# Patient Record
Sex: FEMALE | Race: WHITE | NOT HISPANIC OR LATINO | Employment: PART TIME | URBAN - METROPOLITAN AREA
[De-identification: names, ages, dates, MRNs, and addresses within clinical notes are randomized per-mention and may not be internally consistent; named-entity substitution may affect disease eponyms.]

---

## 2016-08-29 LAB
EXTERNAL ABO GROUPING: NORMAL
EXTERNAL ANTIBODY SCREEN: NEGATIVE
EXTERNAL HEPATITIS B SURFACE ANTIGEN: NEGATIVE
EXTERNAL RH FACTOR: POSITIVE
EXTERNAL RUBELLA QUALITATIVE: NORMAL
EXTERNAL SYPHILIS RPR SCREEN: NORMAL
HIV1 AB SPEC QL IA.RAPID: NEGATIVE

## 2017-02-09 PROBLEM — O36.8190 DECREASED FETAL MOVEMENT: Status: ACTIVE | Noted: 2017-02-09

## 2017-03-20 ENCOUNTER — LAB REQUISITION (OUTPATIENT)
Dept: LAB | Facility: HOSPITAL | Age: 27
End: 2017-03-20

## 2017-03-20 DIAGNOSIS — Z34.83 ENCOUNTER FOR SUPERVISION OF OTHER NORMAL PREGNANCY, THIRD TRIMESTER: ICD-10-CM

## 2017-03-20 PROCEDURE — 87081 CULTURE SCREEN ONLY: CPT | Performed by: OBSTETRICS & GYNECOLOGY

## 2017-03-23 LAB
BACTERIA SPEC AEROBE CULT: NORMAL
EXTERNAL GROUP B STREP ANTIGEN: NEGATIVE

## 2017-03-26 ENCOUNTER — HOSPITAL ENCOUNTER (INPATIENT)
Facility: HOSPITAL | Age: 27
LOS: 4 days | Discharge: HOME | End: 2017-03-31
Attending: OBSTETRICS & GYNECOLOGY | Admitting: OBSTETRICS & GYNECOLOGY

## 2017-03-26 PROCEDURE — 87086 URINE CULTURE/COLONY COUNT: CPT | Performed by: OBSTETRICS & GYNECOLOGY

## 2017-03-26 PROCEDURE — 81001 URINALYSIS AUTO W/SCOPE: CPT | Performed by: OBSTETRICS & GYNECOLOGY

## 2017-03-26 PROCEDURE — 80306 DRUG TEST PRSMV INSTRMNT: CPT | Performed by: OBSTETRICS & GYNECOLOGY

## 2017-03-26 PROCEDURE — 59025 FETAL NON-STRESS TEST: CPT

## 2017-03-27 ENCOUNTER — ANESTHESIA EVENT (OUTPATIENT)
Dept: LABOR AND DELIVERY | Facility: HOSPITAL | Age: 27
End: 2017-03-27

## 2017-03-27 ENCOUNTER — ANESTHESIA (OUTPATIENT)
Dept: LABOR AND DELIVERY | Facility: HOSPITAL | Age: 27
End: 2017-03-27

## 2017-03-27 PROBLEM — O45.90 PLACENTAL ABRUPTION: Status: ACTIVE | Noted: 2017-03-27

## 2017-03-27 LAB
ABO GROUP BLD: NORMAL
AMPHET+METHAMPHET UR QL: NEGATIVE
AMPHETAMINES UR QL: NEGATIVE
ATMOSPHERIC PRESS: ABNORMAL MMHG
ATMOSPHERIC PRESS: ABNORMAL MMHG
BACTERIA UR QL AUTO: ABNORMAL /HPF
BARBITURATES UR QL SCN: NEGATIVE
BASE EXCESS BLDCOA CALC-SCNC: 0.1 MMOL/L (ref 0–2)
BASE EXCESS BLDCOV CALC-SCNC: -0.9 MMOL/L (ref 0–2)
BDY SITE: ABNORMAL
BENZODIAZ UR QL SCN: NEGATIVE
BILIRUB UR QL STRIP: NEGATIVE
BLD GP AB SCN SERPL QL: NEGATIVE
BUPRENORPHINE SERPL-MCNC: NEGATIVE NG/ML
CANNABINOIDS SERPL QL: NEGATIVE
CLARITY UR: CLEAR
CO2 BLDA-SCNC: 26.3 MMOL/L (ref 22–33)
CO2 BLDA-SCNC: 28.4 MMOL/L (ref 22–33)
COCAINE UR QL: NEGATIVE
COLOR UR: YELLOW
DEPRECATED RDW RBC AUTO: 50.7 FL (ref 37–54)
ERYTHROCYTE [DISTWIDTH] IN BLOOD BY AUTOMATED COUNT: 14.7 % (ref 11.3–14.5)
GLUCOSE UR STRIP-MCNC: NEGATIVE MG/DL
HCO3 BLDCOA-SCNC: 26.8 MMOL/L (ref 16.9–20.5)
HCO3 BLDCOV-SCNC: 24.9 MMOL/L (ref 18.6–21.4)
HCT VFR BLD AUTO: 34.1 % (ref 34.5–44)
HGB BLD-MCNC: 11.2 G/DL (ref 11.5–15.5)
HGB BLDA-MCNC: 14.3 G/DL (ref 14–18)
HGB BLDA-MCNC: 14.5 G/DL (ref 14–18)
HGB UR QL STRIP.AUTO: NEGATIVE
HOROWITZ INDEX BLD+IHG-RTO: 21 %
HOROWITZ INDEX BLD+IHG-RTO: 21 %
HYALINE CASTS UR QL AUTO: ABNORMAL /LPF
KETONES UR QL STRIP: ABNORMAL
LEUKOCYTE ESTERASE UR QL STRIP.AUTO: ABNORMAL
MCH RBC QN AUTO: 31.3 PG (ref 27–31)
MCHC RBC AUTO-ENTMCNC: 32.8 G/DL (ref 32–36)
MCV RBC AUTO: 95.3 FL (ref 80–99)
METHADONE UR QL SCN: NEGATIVE
MODALITY: ABNORMAL
MODALITY: ABNORMAL
NITRITE UR QL STRIP: NEGATIVE
OPIATES UR QL: NEGATIVE
OXYCODONE UR QL SCN: NEGATIVE
PCO2 BLDCOA: 53.5 MMHG (ref 43.3–54.9)
PCO2 BLDCOV: 45 MM HG (ref 30–60)
PCP UR QL SCN: NEGATIVE
PH BLDCOA: 7.31 [PH] (ref 7.2–7.3)
PH BLDCOV: 7.35 [PH] (ref 7.19–7.46)
PH UR STRIP.AUTO: 7 [PH] (ref 5–8)
PLATELET # BLD AUTO: 163 10*3/MM3 (ref 150–450)
PMV BLD AUTO: 11.6 FL (ref 6–12)
PO2 BLDCOA: 19.1 MMHG (ref 11.5–43.3)
PO2 BLDCOV: 29 MM HG
PROPOXYPH UR QL: NEGATIVE
PROT UR QL STRIP: NEGATIVE
RBC # BLD AUTO: 3.58 10*6/MM3 (ref 3.89–5.14)
RBC # UR: ABNORMAL /HPF
REF LAB TEST METHOD: ABNORMAL
RH BLD: POSITIVE
SAO2 % BLDCOA: 35.5 %
SAO2 % BLDCOA: ABNORMAL % (ref 45–75)
SAO2 % BLDCOV: 64 %
SP GR UR STRIP: 1.01 (ref 1–1.03)
SQUAMOUS #/AREA URNS HPF: ABNORMAL /HPF
TRICYCLICS UR QL SCN: NEGATIVE
UROBILINOGEN UR QL STRIP: ABNORMAL
WBC NRBC COR # BLD: 9.97 10*3/MM3 (ref 3.5–10.8)
WBC UR QL AUTO: ABNORMAL /HPF

## 2017-03-27 PROCEDURE — 59025 FETAL NON-STRESS TEST: CPT

## 2017-03-27 PROCEDURE — 25010000002 MIDAZOLAM PER 1 MG: Performed by: NURSE ANESTHETIST, CERTIFIED REGISTERED

## 2017-03-27 PROCEDURE — 25010000002 FENTANYL CITRATE (PF) 100 MCG/2ML SOLUTION: Performed by: NURSE ANESTHETIST, CERTIFIED REGISTERED

## 2017-03-27 PROCEDURE — 86901 BLOOD TYPING SEROLOGIC RH(D): CPT | Performed by: OBSTETRICS & GYNECOLOGY

## 2017-03-27 PROCEDURE — 25010000002 ONDANSETRON PER 1 MG: Performed by: NURSE ANESTHETIST, CERTIFIED REGISTERED

## 2017-03-27 PROCEDURE — 82805 BLOOD GASES W/O2 SATURATION: CPT | Performed by: OBSTETRICS & GYNECOLOGY

## 2017-03-27 PROCEDURE — 86900 BLOOD TYPING SEROLOGIC ABO: CPT | Performed by: OBSTETRICS & GYNECOLOGY

## 2017-03-27 PROCEDURE — 99218 PR INITIAL OBSERVATION CARE/DAY 30 MINUTES: CPT | Performed by: OBSTETRICS & GYNECOLOGY

## 2017-03-27 PROCEDURE — 3E0M05Z INTRODUCTION OF ADHESION BARRIER INTO PERITONEAL CAVITY, OPEN APPROACH: ICD-10-PCS | Performed by: OBSTETRICS & GYNECOLOGY

## 2017-03-27 PROCEDURE — 25010000002 PHENYLEPHRINE PER 1 ML: Performed by: NURSE ANESTHETIST, CERTIFIED REGISTERED

## 2017-03-27 PROCEDURE — C1765 ADHESION BARRIER: HCPCS | Performed by: OBSTETRICS & GYNECOLOGY

## 2017-03-27 PROCEDURE — 86850 RBC ANTIBODY SCREEN: CPT | Performed by: OBSTETRICS & GYNECOLOGY

## 2017-03-27 PROCEDURE — 25010000002 MORPHINE PER 10 MG: Performed by: OBSTETRICS & GYNECOLOGY

## 2017-03-27 PROCEDURE — 25010000002 MORPHINE PER 10 MG: Performed by: NURSE ANESTHETIST, CERTIFIED REGISTERED

## 2017-03-27 PROCEDURE — 85027 COMPLETE CBC AUTOMATED: CPT | Performed by: OBSTETRICS & GYNECOLOGY

## 2017-03-27 PROCEDURE — 25010000003 CEFTRIAXONE PER 250 MG: Performed by: OBSTETRICS & GYNECOLOGY

## 2017-03-27 PROCEDURE — 25010000003 MORPHINE PER 10 MG: Performed by: NURSE ANESTHETIST, CERTIFIED REGISTERED

## 2017-03-27 PROCEDURE — 25010000002 PROMETHAZINE PER 50 MG: Performed by: OBSTETRICS & GYNECOLOGY

## 2017-03-27 PROCEDURE — 88307 TISSUE EXAM BY PATHOLOGIST: CPT | Performed by: OBSTETRICS & GYNECOLOGY

## 2017-03-27 PROCEDURE — 25010000002 DIPHENHYDRAMINE PER 50 MG: Performed by: NURSE ANESTHETIST, CERTIFIED REGISTERED

## 2017-03-27 PROCEDURE — 63710000001 DIPHENHYDRAMINE PER 50 MG: Performed by: NURSE ANESTHETIST, CERTIFIED REGISTERED

## 2017-03-27 RX ORDER — BUPIVACAINE HYDROCHLORIDE 7.5 MG/ML
INJECTION, SOLUTION EPIDURAL; RETROBULBAR AS NEEDED
Status: DISCONTINUED | OUTPATIENT
Start: 2017-03-27 | End: 2017-03-27 | Stop reason: SURG

## 2017-03-27 RX ORDER — DIPHENHYDRAMINE HYDROCHLORIDE 50 MG/ML
25 INJECTION INTRAMUSCULAR; INTRAVENOUS EVERY 4 HOURS PRN
Status: DISCONTINUED | OUTPATIENT
Start: 2017-03-27 | End: 2017-03-31 | Stop reason: HOSPADM

## 2017-03-27 RX ORDER — SIMETHICONE 80 MG
80 TABLET,CHEWABLE ORAL 4 TIMES DAILY PRN
Status: DISCONTINUED | OUTPATIENT
Start: 2017-03-27 | End: 2017-03-31 | Stop reason: HOSPADM

## 2017-03-27 RX ORDER — SODIUM CHLORIDE 9 MG/ML
125 INJECTION, SOLUTION INTRAVENOUS CONTINUOUS
Status: DISCONTINUED | OUTPATIENT
Start: 2017-03-27 | End: 2017-03-31 | Stop reason: HOSPADM

## 2017-03-27 RX ORDER — ONDANSETRON 2 MG/ML
4 INJECTION INTRAMUSCULAR; INTRAVENOUS ONCE AS NEEDED
Status: DISCONTINUED | OUTPATIENT
Start: 2017-03-27 | End: 2017-03-31 | Stop reason: HOSPADM

## 2017-03-27 RX ORDER — NALOXONE HCL 0.4 MG/ML
0.4 VIAL (ML) INJECTION
Status: ACTIVE | OUTPATIENT
Start: 2017-03-27 | End: 2017-03-28

## 2017-03-27 RX ORDER — DOCUSATE SODIUM 100 MG/1
100 CAPSULE, LIQUID FILLED ORAL 2 TIMES DAILY PRN
Status: DISCONTINUED | OUTPATIENT
Start: 2017-03-27 | End: 2017-03-31 | Stop reason: HOSPADM

## 2017-03-27 RX ORDER — MORPHINE SULFATE 0.5 MG/ML
INJECTION, SOLUTION EPIDURAL; INTRATHECAL; INTRAVENOUS AS NEEDED
Status: DISCONTINUED | OUTPATIENT
Start: 2017-03-27 | End: 2017-03-27 | Stop reason: SURG

## 2017-03-27 RX ORDER — SODIUM CHLORIDE 0.9 % (FLUSH) 0.9 %
1-10 SYRINGE (ML) INJECTION AS NEEDED
Status: DISCONTINUED | OUTPATIENT
Start: 2017-03-27 | End: 2017-03-31 | Stop reason: HOSPADM

## 2017-03-27 RX ORDER — MORPHINE SULFATE 4 MG/ML
2 INJECTION, SOLUTION INTRAMUSCULAR; INTRAVENOUS
Status: DISCONTINUED | OUTPATIENT
Start: 2017-03-27 | End: 2017-03-31 | Stop reason: HOSPADM

## 2017-03-27 RX ORDER — LANOLIN 100 %
OINTMENT (GRAM) TOPICAL
Status: DISCONTINUED | OUTPATIENT
Start: 2017-03-27 | End: 2017-03-31 | Stop reason: HOSPADM

## 2017-03-27 RX ORDER — MORPHINE SULFATE 4 MG/ML
4 INJECTION, SOLUTION INTRAMUSCULAR; INTRAVENOUS ONCE
Status: COMPLETED | OUTPATIENT
Start: 2017-03-27 | End: 2017-03-27

## 2017-03-27 RX ORDER — SODIUM CHLORIDE, SODIUM LACTATE, POTASSIUM CHLORIDE, CALCIUM CHLORIDE 600; 310; 30; 20 MG/100ML; MG/100ML; MG/100ML; MG/100ML
1000 INJECTION, SOLUTION INTRAVENOUS CONTINUOUS
Status: DISCONTINUED | OUTPATIENT
Start: 2017-03-27 | End: 2017-03-27

## 2017-03-27 RX ORDER — PROMETHAZINE HYDROCHLORIDE 25 MG/ML
12.5 INJECTION, SOLUTION INTRAMUSCULAR; INTRAVENOUS ONCE
Status: COMPLETED | OUTPATIENT
Start: 2017-03-27 | End: 2017-03-27

## 2017-03-27 RX ORDER — ONDANSETRON 4 MG/1
4 TABLET, FILM COATED ORAL EVERY 8 HOURS PRN
Status: DISCONTINUED | OUTPATIENT
Start: 2017-03-27 | End: 2017-03-31 | Stop reason: HOSPADM

## 2017-03-27 RX ORDER — DIPHENHYDRAMINE HYDROCHLORIDE 50 MG/ML
INJECTION INTRAMUSCULAR; INTRAVENOUS AS NEEDED
Status: DISCONTINUED | OUTPATIENT
Start: 2017-03-27 | End: 2017-03-27 | Stop reason: SURG

## 2017-03-27 RX ORDER — MIDAZOLAM HYDROCHLORIDE 1 MG/ML
INJECTION INTRAMUSCULAR; INTRAVENOUS AS NEEDED
Status: DISCONTINUED | OUTPATIENT
Start: 2017-03-27 | End: 2017-03-27 | Stop reason: SURG

## 2017-03-27 RX ORDER — DIPHENHYDRAMINE HCL 25 MG
25 CAPSULE ORAL EVERY 4 HOURS PRN
Status: DISCONTINUED | OUTPATIENT
Start: 2017-03-27 | End: 2017-03-31 | Stop reason: HOSPADM

## 2017-03-27 RX ORDER — OXYCODONE HYDROCHLORIDE AND ACETAMINOPHEN 5; 325 MG/1; MG/1
1 TABLET ORAL EVERY 4 HOURS PRN
Status: DISCONTINUED | OUTPATIENT
Start: 2017-03-27 | End: 2017-03-31 | Stop reason: HOSPADM

## 2017-03-27 RX ORDER — OXYCODONE AND ACETAMINOPHEN 10; 325 MG/1; MG/1
1 TABLET ORAL EVERY 4 HOURS PRN
Status: DISCONTINUED | OUTPATIENT
Start: 2017-03-27 | End: 2017-03-31 | Stop reason: HOSPADM

## 2017-03-27 RX ORDER — CEFTRIAXONE SODIUM 1 G/50ML
1 INJECTION, SOLUTION INTRAVENOUS
Status: DISCONTINUED | OUTPATIENT
Start: 2017-03-27 | End: 2017-03-28

## 2017-03-27 RX ORDER — OXYTOCIN 10 [USP'U]/ML
INJECTION, SOLUTION INTRAMUSCULAR; INTRAVENOUS AS NEEDED
Status: DISCONTINUED | OUTPATIENT
Start: 2017-03-27 | End: 2017-03-27 | Stop reason: SURG

## 2017-03-27 RX ORDER — SODIUM CHLORIDE, SODIUM LACTATE, POTASSIUM CHLORIDE, CALCIUM CHLORIDE 600; 310; 30; 20 MG/100ML; MG/100ML; MG/100ML; MG/100ML
2000 INJECTION, SOLUTION INTRAVENOUS ONCE
Status: COMPLETED | OUTPATIENT
Start: 2017-03-27 | End: 2017-03-27

## 2017-03-27 RX ORDER — IBUPROFEN 600 MG/1
600 TABLET ORAL EVERY 8 HOURS PRN
Status: DISCONTINUED | OUTPATIENT
Start: 2017-03-27 | End: 2017-03-31 | Stop reason: HOSPADM

## 2017-03-27 RX ORDER — ONDANSETRON 2 MG/ML
INJECTION INTRAMUSCULAR; INTRAVENOUS AS NEEDED
Status: DISCONTINUED | OUTPATIENT
Start: 2017-03-27 | End: 2017-03-27 | Stop reason: SURG

## 2017-03-27 RX ORDER — MORPHINE SULFATE 2 MG/ML
2 INJECTION, SOLUTION INTRAMUSCULAR; INTRAVENOUS
Status: ACTIVE | OUTPATIENT
Start: 2017-03-27 | End: 2017-03-27

## 2017-03-27 RX ORDER — FENTANYL CITRATE 50 UG/ML
INJECTION, SOLUTION INTRAMUSCULAR; INTRAVENOUS AS NEEDED
Status: DISCONTINUED | OUTPATIENT
Start: 2017-03-27 | End: 2017-03-27 | Stop reason: SURG

## 2017-03-27 RX ADMIN — CEFTRIAXONE SODIUM 1 G: 1 INJECTION, SOLUTION INTRAVENOUS at 22:13

## 2017-03-27 RX ADMIN — OXYTOCIN 40 UNITS: 10 INJECTION, SOLUTION INTRAMUSCULAR; INTRAVENOUS at 09:12

## 2017-03-27 RX ADMIN — IBUPROFEN 600 MG: 600 TABLET ORAL at 11:57

## 2017-03-27 RX ADMIN — OXYCODONE AND ACETAMINOPHEN 1 TABLET: 5; 325 TABLET ORAL at 15:37

## 2017-03-27 RX ADMIN — MORPHINE SULFATE 0.15 MG: 0.5 INJECTION, SOLUTION EPIDURAL; INTRATHECAL; INTRAVENOUS at 08:50

## 2017-03-27 RX ADMIN — SIMETHICONE CHEW TAB 80 MG 80 MG: 80 TABLET ORAL at 17:41

## 2017-03-27 RX ADMIN — ONDANSETRON 4 MG: 2 INJECTION INTRAMUSCULAR; INTRAVENOUS at 09:25

## 2017-03-27 RX ADMIN — DIPHENHYDRAMINE HYDROCHLORIDE 25 MG: 25 CAPSULE ORAL at 17:30

## 2017-03-27 RX ADMIN — FENTANYL CITRATE 15 MCG: 50 INJECTION, SOLUTION INTRAMUSCULAR; INTRAVENOUS at 08:50

## 2017-03-27 RX ADMIN — SODIUM CHLORIDE, POTASSIUM CHLORIDE, SODIUM LACTATE AND CALCIUM CHLORIDE 2000 ML/HR: 600; 310; 30; 20 INJECTION, SOLUTION INTRAVENOUS at 00:35

## 2017-03-27 RX ADMIN — SODIUM CHLORIDE, POTASSIUM CHLORIDE, SODIUM LACTATE AND CALCIUM CHLORIDE: 600; 310; 30; 20 INJECTION, SOLUTION INTRAVENOUS at 08:38

## 2017-03-27 RX ADMIN — MORPHINE SULFATE 2 MG: 4 INJECTION, SOLUTION INTRAMUSCULAR; INTRAVENOUS at 10:50

## 2017-03-27 RX ADMIN — MIDAZOLAM HYDROCHLORIDE 2 MG: 1 INJECTION, SOLUTION INTRAMUSCULAR; INTRAVENOUS at 08:39

## 2017-03-27 RX ADMIN — EPHEDRINE SULFATE 10 MG: 50 INJECTION INTRAMUSCULAR; INTRAVENOUS; SUBCUTANEOUS at 08:57

## 2017-03-27 RX ADMIN — IBUPROFEN 600 MG: 600 TABLET ORAL at 19:31

## 2017-03-27 RX ADMIN — OXYCODONE AND ACETAMINOPHEN 1 TABLET: 5; 325 TABLET ORAL at 19:31

## 2017-03-27 RX ADMIN — PHENYLEPHRINE HYDROCHLORIDE 100 MCG: 10 INJECTION INTRAVENOUS at 08:53

## 2017-03-27 RX ADMIN — SODIUM CHLORIDE, POTASSIUM CHLORIDE, SODIUM LACTATE AND CALCIUM CHLORIDE 1000 ML/HR: 600; 310; 30; 20 INJECTION, SOLUTION INTRAVENOUS at 08:00

## 2017-03-27 RX ADMIN — OXYTOCIN 30 UNITS: 10 INJECTION, SOLUTION INTRAMUSCULAR; INTRAVENOUS at 09:22

## 2017-03-27 RX ADMIN — PROMETHAZINE HYDROCHLORIDE 12.5 MG: 25 INJECTION, SOLUTION INTRAMUSCULAR; INTRAVENOUS at 00:51

## 2017-03-27 RX ADMIN — SODIUM CHLORIDE 125 ML/HR: 9 INJECTION, SOLUTION INTRAVENOUS at 01:13

## 2017-03-27 RX ADMIN — CEFTRIAXONE SODIUM 1 G: 1 INJECTION, SOLUTION INTRAVENOUS at 01:13

## 2017-03-27 RX ADMIN — EPHEDRINE SULFATE 10 MG: 50 INJECTION INTRAMUSCULAR; INTRAVENOUS; SUBCUTANEOUS at 08:56

## 2017-03-27 RX ADMIN — OXYCODONE AND ACETAMINOPHEN 1 TABLET: 5; 325 TABLET ORAL at 23:29

## 2017-03-27 RX ADMIN — MORPHINE SULFATE 4.85 MG: 0.5 INJECTION, SOLUTION EPIDURAL; INTRATHECAL; INTRAVENOUS at 09:21

## 2017-03-27 RX ADMIN — MIDAZOLAM HYDROCHLORIDE 2 MG: 1 INJECTION, SOLUTION INTRAMUSCULAR; INTRAVENOUS at 09:21

## 2017-03-27 RX ADMIN — OXYCODONE AND ACETAMINOPHEN 1 TABLET: 5; 325 TABLET ORAL at 11:57

## 2017-03-27 RX ADMIN — MIDAZOLAM HYDROCHLORIDE 1 MG: 1 INJECTION, SOLUTION INTRAMUSCULAR; INTRAVENOUS at 09:14

## 2017-03-27 RX ADMIN — Medication 5 APPLICATION: at 12:57

## 2017-03-27 RX ADMIN — SODIUM CHLORIDE, POTASSIUM CHLORIDE, SODIUM LACTATE AND CALCIUM CHLORIDE: 600; 310; 30; 20 INJECTION, SOLUTION INTRAVENOUS at 08:55

## 2017-03-27 RX ADMIN — SODIUM CHLORIDE, POTASSIUM CHLORIDE, SODIUM LACTATE AND CALCIUM CHLORIDE: 600; 310; 30; 20 INJECTION, SOLUTION INTRAVENOUS at 09:22

## 2017-03-27 RX ADMIN — DIPHENHYDRAMINE HYDROCHLORIDE 50 MG: 50 INJECTION INTRAMUSCULAR; INTRAVENOUS at 09:50

## 2017-03-27 RX ADMIN — PHENYLEPHRINE HYDROCHLORIDE 100 MCG: 10 INJECTION INTRAVENOUS at 09:03

## 2017-03-27 RX ADMIN — DOCUSATE SODIUM 100 MG: 100 CAPSULE, LIQUID FILLED ORAL at 17:41

## 2017-03-27 RX ADMIN — BUPIVACAINE HYDROCHLORIDE 1.4 ML: 7.5 INJECTION, SOLUTION EPIDURAL; RETROBULBAR at 08:50

## 2017-03-27 RX ADMIN — MORPHINE SULFATE 4 MG: 4 INJECTION INTRAVENOUS at 00:50

## 2017-03-27 NOTE — ANESTHESIA PREPROCEDURE EVALUATION
Anesthesia Evaluation     Patient summary reviewed and Nursing notes reviewed   no history of anesthetic complications:  NPO Status: > 8 hours   Airway   Mallampati: II  TM distance: >3 FB  Neck ROM: full  possible difficult intubation  Dental - normal exam     Pulmonary - negative pulmonary ROS   Cardiovascular - negative cardio ROS        Neuro/Psych- negative ROS  GI/Hepatic/Renal/Endo    (+) obesity, morbid obesity,     Musculoskeletal (-) negative ROS    Abdominal    Substance History - negative use     OB/GYN    (+) Pregnant,     Comment: Pt kavita, some vaginal bleeding, decision per dr. Dumas for C/S      Other - negative ROS                                   Anesthesia Plan    ASA 3 - emergent     spinal and ITN     Anesthetic plan and risks discussed with patient.

## 2017-03-27 NOTE — ANESTHESIA POSTPROCEDURE EVALUATION
Patient: Sabrina Bass    Procedure Summary     Date Anesthesia Start Anesthesia Stop Room / Location    17 [id number]  [facility]       Procedure Diagnosis Surgeon Provider     SECTION REPEAT (N/A Abdomen) No diagnosis on file. MD All Mckeon MD          Anesthesia Type: spinal, ITN  Last vitals  BP  111/64    Temp 98.1   Pulse 91   Resp 18   SpO2 100%     Post Anesthesia Care and Evaluation    Patient location during evaluation: bedside  Patient participation: complete - patient participated  Level of consciousness: awake and alert  Pain score: 0  Pain management: adequate  Airway patency: patent  Anesthetic complications: No anesthetic complications    Cardiovascular status: acceptable  Respiratory status: acceptable  Hydration status: acceptable

## 2017-03-28 LAB
BASOPHILS # BLD AUTO: 0.02 10*3/MM3 (ref 0–0.2)
BASOPHILS NFR BLD AUTO: 0.2 % (ref 0–1)
DEPRECATED RDW RBC AUTO: 53.6 FL (ref 37–54)
EOSINOPHIL # BLD AUTO: 0.12 10*3/MM3 (ref 0.1–0.3)
EOSINOPHIL NFR BLD AUTO: 1.3 % (ref 0–3)
ERYTHROCYTE [DISTWIDTH] IN BLOOD BY AUTOMATED COUNT: 15.2 % (ref 11.3–14.5)
HCT VFR BLD AUTO: 28.4 % (ref 34.5–44)
HGB BLD-MCNC: 9.3 G/DL (ref 11.5–15.5)
IMM GRANULOCYTES # BLD: 0.09 10*3/MM3 (ref 0–0.03)
IMM GRANULOCYTES NFR BLD: 0.9 % (ref 0–0.6)
LYMPHOCYTES # BLD AUTO: 2.19 10*3/MM3 (ref 0.6–4.8)
LYMPHOCYTES NFR BLD AUTO: 22.8 % (ref 24–44)
MCH RBC QN AUTO: 31.5 PG (ref 27–31)
MCHC RBC AUTO-ENTMCNC: 32.7 G/DL (ref 32–36)
MCV RBC AUTO: 96.3 FL (ref 80–99)
MONOCYTES # BLD AUTO: 1.25 10*3/MM3 (ref 0–1)
MONOCYTES NFR BLD AUTO: 13 % (ref 0–12)
NEUTROPHILS # BLD AUTO: 5.92 10*3/MM3 (ref 1.5–8.3)
NEUTROPHILS NFR BLD AUTO: 61.8 % (ref 41–71)
NRBC BLD MANUAL-RTO: 0 /100 WBC (ref 0–0)
PLATELET # BLD AUTO: 144 10*3/MM3 (ref 150–450)
PMV BLD AUTO: 11.5 FL (ref 6–12)
RBC # BLD AUTO: 2.95 10*6/MM3 (ref 3.89–5.14)
WBC NRBC COR # BLD: 9.59 10*3/MM3 (ref 3.5–10.8)

## 2017-03-28 PROCEDURE — 85025 COMPLETE CBC W/AUTO DIFF WBC: CPT | Performed by: OBSTETRICS & GYNECOLOGY

## 2017-03-28 RX ADMIN — OXYCODONE AND ACETAMINOPHEN 1 TABLET: 5; 325 TABLET ORAL at 05:04

## 2017-03-28 RX ADMIN — SIMETHICONE CHEW TAB 80 MG 80 MG: 80 TABLET ORAL at 17:41

## 2017-03-28 RX ADMIN — DOCUSATE SODIUM 100 MG: 100 CAPSULE, LIQUID FILLED ORAL at 17:41

## 2017-03-28 RX ADMIN — OXYCODONE HYDROCHLORIDE AND ACETAMINOPHEN 1 TABLET: 10; 325 TABLET ORAL at 17:42

## 2017-03-28 RX ADMIN — SIMETHICONE CHEW TAB 80 MG 80 MG: 80 TABLET ORAL at 12:59

## 2017-03-28 RX ADMIN — IBUPROFEN 600 MG: 600 TABLET ORAL at 13:00

## 2017-03-28 RX ADMIN — OXYCODONE HYDROCHLORIDE AND ACETAMINOPHEN 1 TABLET: 10; 325 TABLET ORAL at 13:38

## 2017-03-28 RX ADMIN — IBUPROFEN 600 MG: 600 TABLET ORAL at 05:04

## 2017-03-28 RX ADMIN — OXYCODONE AND ACETAMINOPHEN 1 TABLET: 5; 325 TABLET ORAL at 09:41

## 2017-03-28 RX ADMIN — IBUPROFEN 600 MG: 600 TABLET ORAL at 21:36

## 2017-03-28 RX ADMIN — SIMETHICONE CHEW TAB 80 MG 80 MG: 80 TABLET ORAL at 21:43

## 2017-03-28 RX ADMIN — OXYCODONE HYDROCHLORIDE AND ACETAMINOPHEN 1 TABLET: 10; 325 TABLET ORAL at 21:36

## 2017-03-28 RX ADMIN — DOCUSATE SODIUM 100 MG: 100 CAPSULE, LIQUID FILLED ORAL at 08:23

## 2017-03-28 RX ADMIN — SIMETHICONE CHEW TAB 80 MG 80 MG: 80 TABLET ORAL at 08:23

## 2017-03-29 LAB
BACTERIA SPEC AEROBE CULT: NORMAL
CYTO UR: NORMAL
LAB AP CASE REPORT: NORMAL
LAB AP CLINICAL INFORMATION: NORMAL
Lab: NORMAL
PATH REPORT.FINAL DX SPEC: NORMAL
PATH REPORT.GROSS SPEC: NORMAL

## 2017-03-29 RX ADMIN — OXYCODONE HYDROCHLORIDE AND ACETAMINOPHEN 1 TABLET: 10; 325 TABLET ORAL at 22:30

## 2017-03-29 RX ADMIN — OXYCODONE HYDROCHLORIDE AND ACETAMINOPHEN 1 TABLET: 10; 325 TABLET ORAL at 01:39

## 2017-03-29 RX ADMIN — SIMETHICONE CHEW TAB 80 MG 80 MG: 80 TABLET ORAL at 18:25

## 2017-03-29 RX ADMIN — DOCUSATE SODIUM 100 MG: 100 CAPSULE, LIQUID FILLED ORAL at 18:25

## 2017-03-29 RX ADMIN — OXYCODONE HYDROCHLORIDE AND ACETAMINOPHEN 1 TABLET: 10; 325 TABLET ORAL at 18:25

## 2017-03-29 RX ADMIN — IBUPROFEN 600 MG: 600 TABLET ORAL at 22:30

## 2017-03-29 RX ADMIN — OXYCODONE HYDROCHLORIDE AND ACETAMINOPHEN 1 TABLET: 10; 325 TABLET ORAL at 10:38

## 2017-03-29 RX ADMIN — OXYCODONE HYDROCHLORIDE AND ACETAMINOPHEN 1 TABLET: 10; 325 TABLET ORAL at 05:58

## 2017-03-29 RX ADMIN — SIMETHICONE CHEW TAB 80 MG 80 MG: 80 TABLET ORAL at 10:38

## 2017-03-29 RX ADMIN — SIMETHICONE CHEW TAB 80 MG 80 MG: 80 TABLET ORAL at 13:08

## 2017-03-29 RX ADMIN — IBUPROFEN 600 MG: 600 TABLET ORAL at 13:08

## 2017-03-29 RX ADMIN — IBUPROFEN 600 MG: 600 TABLET ORAL at 05:58

## 2017-03-29 RX ADMIN — DOCUSATE SODIUM 100 MG: 100 CAPSULE, LIQUID FILLED ORAL at 10:38

## 2017-03-30 RX ADMIN — SIMETHICONE CHEW TAB 80 MG 80 MG: 80 TABLET ORAL at 20:11

## 2017-03-30 RX ADMIN — IBUPROFEN 600 MG: 600 TABLET ORAL at 08:26

## 2017-03-30 RX ADMIN — DOCUSATE SODIUM 100 MG: 100 CAPSULE, LIQUID FILLED ORAL at 08:26

## 2017-03-30 RX ADMIN — OXYCODONE HYDROCHLORIDE AND ACETAMINOPHEN 1 TABLET: 10; 325 TABLET ORAL at 03:06

## 2017-03-30 RX ADMIN — IBUPROFEN 600 MG: 600 TABLET ORAL at 17:57

## 2017-03-30 RX ADMIN — DOCUSATE SODIUM 100 MG: 100 CAPSULE, LIQUID FILLED ORAL at 17:57

## 2017-03-30 RX ADMIN — SIMETHICONE CHEW TAB 80 MG 80 MG: 80 TABLET ORAL at 08:25

## 2017-03-30 RX ADMIN — OXYCODONE HYDROCHLORIDE AND ACETAMINOPHEN 1 TABLET: 10; 325 TABLET ORAL at 13:13

## 2017-03-30 RX ADMIN — OXYCODONE AND ACETAMINOPHEN 1 TABLET: 5; 325 TABLET ORAL at 20:11

## 2017-03-30 RX ADMIN — OXYCODONE HYDROCHLORIDE AND ACETAMINOPHEN 1 TABLET: 10; 325 TABLET ORAL at 08:26

## 2017-03-31 VITALS
WEIGHT: 244 LBS | BODY MASS INDEX: 40.65 KG/M2 | OXYGEN SATURATION: 98 % | TEMPERATURE: 97.3 F | DIASTOLIC BLOOD PRESSURE: 76 MMHG | SYSTOLIC BLOOD PRESSURE: 115 MMHG | RESPIRATION RATE: 14 BRPM | HEIGHT: 65 IN | HEART RATE: 61 BPM

## 2017-03-31 PROBLEM — O45.90 PLACENTAL ABRUPTION: Status: RESOLVED | Noted: 2017-03-27 | Resolved: 2017-03-31

## 2017-03-31 PROBLEM — O36.8190 DECREASED FETAL MOVEMENT: Status: RESOLVED | Noted: 2017-02-09 | Resolved: 2017-03-31

## 2017-03-31 RX ORDER — OXYCODONE HYDROCHLORIDE AND ACETAMINOPHEN 5; 325 MG/1; MG/1
1-2 TABLET ORAL EVERY 4 HOURS PRN
Qty: 20 TABLET | Refills: 0 | Status: SHIPPED | OUTPATIENT
Start: 2017-03-31 | End: 2017-04-03

## 2017-03-31 RX ORDER — IBUPROFEN 600 MG/1
600 TABLET ORAL EVERY 8 HOURS PRN
Qty: 30 TABLET | Refills: 0 | Status: SHIPPED | OUTPATIENT
Start: 2017-03-31 | End: 2021-12-15

## 2017-03-31 RX ADMIN — IBUPROFEN 600 MG: 600 TABLET ORAL at 08:37

## 2017-03-31 RX ADMIN — IBUPROFEN 600 MG: 600 TABLET ORAL at 00:19

## 2017-03-31 RX ADMIN — OXYCODONE AND ACETAMINOPHEN 1 TABLET: 5; 325 TABLET ORAL at 06:02

## 2017-03-31 RX ADMIN — OXYCODONE AND ACETAMINOPHEN 1 TABLET: 5; 325 TABLET ORAL at 11:03

## 2017-03-31 RX ADMIN — SIMETHICONE CHEW TAB 80 MG 80 MG: 80 TABLET ORAL at 08:37

## 2020-10-29 ENCOUNTER — TELEPHONE (OUTPATIENT)
Dept: OBSTETRICS AND GYNECOLOGY | Facility: CLINIC | Age: 30
End: 2020-10-29

## 2020-10-29 DIAGNOSIS — Z30.44 ENCOUNTER FOR SURVEILLANCE OF VAGINAL RING HORMONAL CONTRACEPTIVE DEVICE: Primary | ICD-10-CM

## 2020-10-29 RX ORDER — ETONOGESTREL AND ETHINYL ESTRADIOL 11.7; 2.7 MG/1; MG/1
INSERT, EXTENDED RELEASE VAGINAL
COMMUNITY
Start: 2020-10-01 | End: 2020-10-29 | Stop reason: SDUPTHER

## 2020-10-29 NOTE — TELEPHONE ENCOUNTER
Patient sees THIERRY Madrigal on 11/05/2020. Called asking if she could be given refill on NuvaRing prior to appt because she is out and due for it prior to her upcoming appt.

## 2020-10-30 RX ORDER — ETONOGESTREL AND ETHINYL ESTRADIOL 11.7; 2.7 MG/1; MG/1
1 INSERT, EXTENDED RELEASE VAGINAL
Qty: 1 EACH | Refills: 0 | Status: SHIPPED | OUTPATIENT
Start: 2020-10-30 | End: 2020-11-04 | Stop reason: SDUPTHER

## 2020-11-04 DIAGNOSIS — Z30.44 ENCOUNTER FOR SURVEILLANCE OF VAGINAL RING HORMONAL CONTRACEPTIVE DEVICE: ICD-10-CM

## 2020-11-04 RX ORDER — ETONOGESTREL AND ETHINYL ESTRADIOL 11.7; 2.7 MG/1; MG/1
1 INSERT, EXTENDED RELEASE VAGINAL
Qty: 1 EACH | Refills: 1 | Status: SHIPPED | OUTPATIENT
Start: 2020-11-04 | End: 2020-12-28 | Stop reason: SDUPTHER

## 2020-11-04 NOTE — TELEPHONE ENCOUNTER
Patient called and had to reschedule her annual and I couldn't get her in until dec 28, she asked if she can get 2 month refill on her nuvaring since she cant get in until then

## 2020-12-28 ENCOUNTER — OFFICE VISIT (OUTPATIENT)
Dept: OBSTETRICS AND GYNECOLOGY | Facility: CLINIC | Age: 30
End: 2020-12-28

## 2020-12-28 VITALS
SYSTOLIC BLOOD PRESSURE: 120 MMHG | BODY MASS INDEX: 43.32 KG/M2 | HEIGHT: 65 IN | WEIGHT: 260 LBS | DIASTOLIC BLOOD PRESSURE: 82 MMHG

## 2020-12-28 DIAGNOSIS — R45.86 MOOD DISTURBANCE: ICD-10-CM

## 2020-12-28 DIAGNOSIS — Z30.44 ENCOUNTER FOR SURVEILLANCE OF VAGINAL RING HORMONAL CONTRACEPTIVE DEVICE: ICD-10-CM

## 2020-12-28 DIAGNOSIS — Z01.419 WOMEN'S ANNUAL ROUTINE GYNECOLOGICAL EXAMINATION: Primary | ICD-10-CM

## 2020-12-28 PROCEDURE — 99395 PREV VISIT EST AGE 18-39: CPT | Performed by: OBSTETRICS & GYNECOLOGY

## 2020-12-28 RX ORDER — ETONOGESTREL AND ETHINYL ESTRADIOL 11.7; 2.7 MG/1; MG/1
1 INSERT, EXTENDED RELEASE VAGINAL
Qty: 1 EACH | Refills: 12 | Status: SHIPPED | OUTPATIENT
Start: 2020-12-28 | End: 2021-12-15

## 2020-12-28 NOTE — PROGRESS NOTES
"GYN Annual Exam     CC - Here for annual exam.        HPI  Sabrina Bass is a 30 y.o. female, , who presents for annual well woman exam. Patient's last menstrual period was 2020..  Periods are regular every 25-35 days, lasting 1-2 days. Although she reports missing her period last month.  Dysmenorrhea:none.  Patient reports problems with: Mood disturbance.  Patient reports for the past 2 to 3 months she has been having a hard time \"feeling like herself\".  She has had some tearful episodes in the past couple of months.  She reports that she is very stressed out and finds herself being overwhelmed easily.  She cannot pinpoint a specific stressor.  She has not had this in the past nor has been treated in the past..  There were no changes to her medical or surgical history since her last visit.. Partner Status: Marital Status: .  New Partners since last visit: no.  Desires STD Screening: no.    Additional OB/GYN History   Current contraception: contraceptive methods: NuvaRing vaginal inserts  Desires to: continue contraception  Last Pap :   Last Completed Pap Smear       Status Date      PAP SMEAR Done 10/14/2019 negative        History of abnormal Pap smear: no  Family history of uterine, colon, breast, or ovarian cancer: yes - colon cancer in her paternal great grandmother. Her mother was adopted  Performs monthly Self-Breast Exam: no  Exercises Regularly: sometimes  Feelings of Anxiety or Depression: possibly some depression. State she is unable to explain how she is feeling. Not related to the pandemic.   Tobacco Usage?: No   OB History        2    Para   2    Term   2            AB        Living   2       SAB        TAB        Ectopic        Molar        Multiple   0    Live Births   2                Health Maintenance   Topic Date Due   • Annual Gynecologic Pelvic and Breast Exam  1992   • ANNUAL PHYSICAL  1995   • TDAP/TD VACCINES (1 - Tdap) 2011   • " "HEPATITIS C SCREENING  03/30/2017   • INFLUENZA VACCINE  08/01/2020   • PAP SMEAR  10/14/2020   • Pneumococcal Vaccine 0-64  Aged Out       The additional following portions of the patient's history were reviewed and updated as appropriate: allergies, current medications, past family history, past medical history, past social history, past surgical history and problem list.    Review of Systems   Constitutional: Negative.    HENT: Negative.    Eyes: Negative.    Respiratory: Negative.    Cardiovascular: Negative.    Gastrointestinal: Negative.    Endocrine: Negative.    Genitourinary: Negative.    Musculoskeletal: Negative.    Skin: Negative.    Allergic/Immunologic: Negative.    Neurological: Negative.    Hematological: Negative.    Psychiatric/Behavioral: Negative.  Positive for depressed mood.     All other systems reviewed and are negative.     I have reviewed and agree with the HPI, ROS, and historical information as entered above. Chio Dumas MD    Objective   /82   Ht 165.1 cm (65\")   Wt 118 kg (260 lb)   LMP 12/27/2020   Breastfeeding No   BMI 43.27 kg/m²     Physical Exam  Vitals signs and nursing note reviewed. Exam conducted with a chaperone present.   Constitutional:       Appearance: She is well-developed.   HENT:      Head: Normocephalic and atraumatic.   Neck:      Musculoskeletal: Normal range of motion. No muscular tenderness.      Thyroid: No thyroid mass or thyromegaly.   Cardiovascular:      Rate and Rhythm: Normal rate and regular rhythm.      Heart sounds: No murmur.   Pulmonary:      Effort: Pulmonary effort is normal. No retractions.      Breath sounds: Normal breath sounds. No wheezing, rhonchi or rales.   Chest:      Chest wall: No mass or tenderness.      Breasts:         Right: Normal. No mass, nipple discharge, skin change or tenderness.         Left: Normal. No mass, nipple discharge, skin change or tenderness.   Abdominal:      General: Bowel sounds are normal.     "  Palpations: Abdomen is soft. Abdomen is not rigid. There is no mass.      Tenderness: There is no abdominal tenderness. There is no guarding.      Hernia: No hernia is present. There is no hernia in the left inguinal area or right inguinal area.   Genitourinary:     General: Normal vulva.      Exam position: Lithotomy position.      Pubic Area: No rash.       Labia:         Right: No rash, tenderness or lesion.         Left: No rash, tenderness or lesion.       Urethra: No urethral pain or urethral swelling.      Vagina: Normal. No vaginal discharge or lesions.      Cervix: No cervical motion tenderness, discharge, lesion or cervical bleeding.      Uterus: Normal. Not enlarged, not fixed and not tender.       Adnexa:         Right: No mass, tenderness or fullness.          Left: No mass, tenderness or fullness.        Rectum: No external hemorrhoid.      Comments: Limited by body habitus  Neurological:      Mental Status: She is alert and oriented to person, place, and time.   Psychiatric:         Behavior: Behavior normal.            Assessment and Plan    Problem List Items Addressed This Visit     None      Visit Diagnoses     Women's annual routine gynecological examination    -  Primary    Relevant Orders    Pap IG, Rfx HPV ASCU    Encounter for surveillance of vaginal ring hormonal contraceptive device        Relevant Medications    etonogestrel-ethinyl estradiol (NUVARING) 0.12-0.015 MG/24HR vaginal ring    Mood disturbance          Counseling offered today.  Patient not interested at this time.  We discussed the importance of a multivitamin and vitamin D.  We also discussed the importance of aerobic exercise.  Patient is to try to institute these things and if not improved will call for counseling.    1. GYN annual well woman exam.   2. OCP's/Vaginal Ring - Discussed side effects of nausea, BTB, headaches, breast tenderness and slight weight gain in the first three cycles.  Understands risks of blood clots,  stroke, and theoretical risk of breast cancer.  Denies family history of blood clots.  3. Reviewed monthly self breast exams.  Instructed to call with lumps, pain, or breast discharge.    4. Reviewed BMI and weight loss as preventative health measures.   5. Reviewed exercise as a preventative health measures.   6. Recommended use of Vitamin D replacement and getting adequate calcium in her diet. (1500mg)  7. Reccommended Flu Vaccine in Fall of each year.  8. RTC in 1 year or PRN with problems  9. Other: Advise Covid vaccine when available      Chio Dumas MD  12/28/2020

## 2021-01-04 DIAGNOSIS — Z01.419 WOMEN'S ANNUAL ROUTINE GYNECOLOGICAL EXAMINATION: ICD-10-CM

## 2021-06-21 ENCOUNTER — TRANSCRIBE ORDERS (OUTPATIENT)
Dept: LAB | Facility: HOSPITAL | Age: 31
End: 2021-06-21

## 2021-06-21 ENCOUNTER — LAB (OUTPATIENT)
Dept: LAB | Facility: HOSPITAL | Age: 31
End: 2021-06-21

## 2021-06-21 DIAGNOSIS — K62.89 ANAL OR RECTAL PAIN: ICD-10-CM

## 2021-06-21 DIAGNOSIS — K62.89 ANAL OR RECTAL PAIN: Primary | ICD-10-CM

## 2021-06-21 LAB
ANION GAP SERPL CALCULATED.3IONS-SCNC: 9.5 MMOL/L (ref 5–15)
BASOPHILS # BLD AUTO: 0.05 10*3/MM3 (ref 0–0.2)
BASOPHILS NFR BLD AUTO: 0.7 % (ref 0–1.5)
BUN SERPL-MCNC: 10 MG/DL (ref 6–20)
BUN/CREAT SERPL: 12.7 (ref 7–25)
CALCIUM SPEC-SCNC: 9.2 MG/DL (ref 8.6–10.5)
CHLORIDE SERPL-SCNC: 104 MMOL/L (ref 98–107)
CO2 SERPL-SCNC: 23.5 MMOL/L (ref 22–29)
CREAT SERPL-MCNC: 0.79 MG/DL (ref 0.57–1)
DEPRECATED RDW RBC AUTO: 40.9 FL (ref 37–54)
EOSINOPHIL # BLD AUTO: 0.11 10*3/MM3 (ref 0–0.4)
EOSINOPHIL NFR BLD AUTO: 1.6 % (ref 0.3–6.2)
ERYTHROCYTE [DISTWIDTH] IN BLOOD BY AUTOMATED COUNT: 12 % (ref 12.3–15.4)
GFR SERPL CREATININE-BSD FRML MDRD: 86 ML/MIN/1.73
GLUCOSE SERPL-MCNC: 130 MG/DL (ref 65–99)
HCT VFR BLD AUTO: 43.4 % (ref 34–46.6)
HGB BLD-MCNC: 14.6 G/DL (ref 12–15.9)
IMM GRANULOCYTES # BLD AUTO: 0.06 10*3/MM3 (ref 0–0.05)
IMM GRANULOCYTES NFR BLD AUTO: 0.8 % (ref 0–0.5)
LYMPHOCYTES # BLD AUTO: 2.18 10*3/MM3 (ref 0.7–3.1)
LYMPHOCYTES NFR BLD AUTO: 30.8 % (ref 19.6–45.3)
MCH RBC QN AUTO: 31.3 PG (ref 26.6–33)
MCHC RBC AUTO-ENTMCNC: 33.6 G/DL (ref 31.5–35.7)
MCV RBC AUTO: 92.9 FL (ref 79–97)
MONOCYTES # BLD AUTO: 0.46 10*3/MM3 (ref 0.1–0.9)
MONOCYTES NFR BLD AUTO: 6.5 % (ref 5–12)
NEUTROPHILS NFR BLD AUTO: 4.21 10*3/MM3 (ref 1.7–7)
NEUTROPHILS NFR BLD AUTO: 59.6 % (ref 42.7–76)
NRBC BLD AUTO-RTO: 0 /100 WBC (ref 0–0.2)
PLATELET # BLD AUTO: 266 10*3/MM3 (ref 140–450)
PMV BLD AUTO: 11.7 FL (ref 6–12)
POTASSIUM SERPL-SCNC: 4.7 MMOL/L (ref 3.5–5.2)
QT INTERVAL: 346 MS
QTC INTERVAL: 432 MS
RBC # BLD AUTO: 4.67 10*6/MM3 (ref 3.77–5.28)
SODIUM SERPL-SCNC: 137 MMOL/L (ref 136–145)
WBC # BLD AUTO: 7.07 10*3/MM3 (ref 3.4–10.8)

## 2021-06-21 PROCEDURE — 93010 ELECTROCARDIOGRAM REPORT: CPT | Performed by: INTERNAL MEDICINE

## 2021-06-21 PROCEDURE — 85025 COMPLETE CBC W/AUTO DIFF WBC: CPT

## 2021-06-21 PROCEDURE — 93005 ELECTROCARDIOGRAM TRACING: CPT

## 2021-06-21 PROCEDURE — 80048 BASIC METABOLIC PNL TOTAL CA: CPT | Performed by: COLON & RECTAL SURGERY

## 2021-06-21 PROCEDURE — 36415 COLL VENOUS BLD VENIPUNCTURE: CPT | Performed by: COLON & RECTAL SURGERY

## 2021-12-30 ENCOUNTER — OFFICE VISIT (OUTPATIENT)
Dept: OBSTETRICS AND GYNECOLOGY | Facility: CLINIC | Age: 31
End: 2021-12-30

## 2021-12-30 VITALS
HEART RATE: 83 BPM | WEIGHT: 262.2 LBS | DIASTOLIC BLOOD PRESSURE: 88 MMHG | BODY MASS INDEX: 43.68 KG/M2 | SYSTOLIC BLOOD PRESSURE: 138 MMHG | HEIGHT: 65 IN | RESPIRATION RATE: 18 BRPM | OXYGEN SATURATION: 99 %

## 2021-12-30 DIAGNOSIS — E66.01 MORBID OBESITY WITH BMI OF 40.0-44.9, ADULT (HCC): ICD-10-CM

## 2021-12-30 DIAGNOSIS — Z01.419 WOMEN'S ANNUAL ROUTINE GYNECOLOGICAL EXAMINATION: Primary | ICD-10-CM

## 2021-12-30 PROBLEM — Z87.19 HISTORY OF ANAL FISSURES: Status: ACTIVE | Noted: 2021-12-30

## 2021-12-30 PROCEDURE — 99395 PREV VISIT EST AGE 18-39: CPT | Performed by: OBSTETRICS & GYNECOLOGY

## 2021-12-30 NOTE — PROGRESS NOTES
GYN Annual Exam     CC - Here for annual exam.        HPI  Sabrina Bass is a 31 y.o. female, , who presents for annual well woman exam. Patient's last menstrual period was 2021..  Periods are regular every 25-35 days, lasting 4 days. .  Dysmenorrhea:mild, occurring first 1-2 days of flow and throughout menses.  Patient reports problems with: none.  Since her last visit the patient underwent surgery for anal fissure repair and hemorroidectomy on 2021. Partner Status: Marital Status: .  She is sexually active. She has not had new partners since her last STD testing. She does not desire STD testing. .    Additional OB/GYN History   Current contraception: contraceptive methods: None  Desires to: do not start contraception  Last Pap :   Last Completed Pap Smear     This patient has no relevant Health Maintenance data.        History of abnormal Pap smear: no  Family history of uterine, colon, breast, or ovarian cancer: yes - PGGF- colon cancer  Performs monthly Self-Breast Exam: no  Exercises Regularly:no  Feelings of Anxiety or Depression: no  Tobacco Usage?: No   OB History        2    Para   2    Term   2            AB        Living   2       SAB        IAB        Ectopic        Molar        Multiple   0    Live Births   2                Health Maintenance   Topic Date Due   • ANNUAL PHYSICAL  Never done   • TDAP/TD VACCINES (1 - Tdap) Never done   • HEPATITIS C SCREENING  Never done   • INFLUENZA VACCINE  Never done   • COVID-19 Vaccine (3 - Booster for Pfizer series) 10/01/2021   • PAP SMEAR  2021   • Annual Gynecologic Pelvic and Breast Exam  2021   • Pneumococcal Vaccine 0-64  Aged Out       The additional following portions of the patient's history were reviewed and updated as appropriate: allergies, current medications, past family history, past medical history, past social history and past surgical history.    Review of Systems   Constitutional:  "Negative.    HENT: Negative.    Eyes: Negative.    Respiratory: Negative.    Cardiovascular: Negative.    Gastrointestinal: Negative.    Endocrine: Negative.    Genitourinary: Negative.    Musculoskeletal: Negative.    Skin: Negative.    Allergic/Immunologic: Negative.    Neurological: Negative.    Hematological: Negative.    Psychiatric/Behavioral: Negative.          I have reviewed and agree with the HPI, ROS, and historical information as entered above. Chio Dumas MD    Objective   /88   Pulse 83   Resp 18   Ht 165.1 cm (65\")   Wt 119 kg (262 lb 3.2 oz)   LMP 11/30/2021   SpO2 99%   BMI 43.63 kg/m²     Physical Exam  Vitals and nursing note reviewed. Exam conducted with a chaperone present.   Constitutional:       Appearance: She is well-developed.   HENT:      Head: Normocephalic and atraumatic.   Neck:      Thyroid: No thyroid mass or thyromegaly.   Cardiovascular:      Rate and Rhythm: Normal rate and regular rhythm.      Heart sounds: No murmur heard.      Pulmonary:      Effort: Pulmonary effort is normal. No retractions.      Breath sounds: Normal breath sounds. No wheezing, rhonchi or rales.   Chest:      Chest wall: No mass or tenderness.   Breasts:      Right: Normal. No mass, nipple discharge, skin change or tenderness.      Left: Normal. No mass, nipple discharge, skin change or tenderness.       Abdominal:      General: Bowel sounds are normal.      Palpations: Abdomen is soft. Abdomen is not rigid. There is no mass.      Tenderness: There is no abdominal tenderness. There is no guarding.      Hernia: No hernia is present. There is no hernia in the left inguinal area or right inguinal area.   Genitourinary:     General: Normal vulva.      Exam position: Lithotomy position.      Pubic Area: No rash.       Labia:         Right: No rash, tenderness or lesion.         Left: No rash, tenderness or lesion.       Urethra: No urethral pain or urethral swelling.      Vagina: Normal. No " vaginal discharge or lesions.      Cervix: No cervical motion tenderness, discharge, lesion or cervical bleeding.      Uterus: Normal. Not enlarged, not fixed and not tender.       Adnexa:         Right: No mass, tenderness or fullness.          Left: No mass, tenderness or fullness.        Rectum: No external hemorrhoid.   Musculoskeletal:      Cervical back: Normal range of motion. No muscular tenderness.   Neurological:      Mental Status: She is alert and oriented to person, place, and time.   Psychiatric:         Behavior: Behavior normal.            Assessment and Plan    Problem List Items Addressed This Visit        Endocrine and Metabolic    Morbid obesity with BMI of 40.0-44.9, adult (HCC)      Other Visit Diagnoses     Women's annual routine gynecological examination    -  Primary    Relevant Orders    Pap IG, HPV-hr          1. GYN annual well woman exam.   2. Preconceptual Counseling.  Discussed timed intercourse, regular cycles, prenatal vitamins, and when to call.  3. Reviewed monthly self breast exams.  Instructed to call with lumps, pain, or breast discharge.    4. Reviewed BMI and weight loss as preventative health measures.   5. Reviewed exercise as a preventative health measures.   6. Recommended use of Vitamin D replacement and getting adequate calcium in her diet. (1500mg)  7. Reccommended Flu Vaccine in Fall of each year.  8. RTC in 1 year or PRN with problems  No follow-ups on file.      Chio Dumas MD  12/30/2021

## 2022-01-11 DIAGNOSIS — Z01.419 WOMEN'S ANNUAL ROUTINE GYNECOLOGICAL EXAMINATION: ICD-10-CM

## 2022-06-06 ENCOUNTER — OFFICE VISIT (OUTPATIENT)
Dept: OBSTETRICS AND GYNECOLOGY | Facility: CLINIC | Age: 32
End: 2022-06-06

## 2022-06-06 VITALS
HEIGHT: 65 IN | DIASTOLIC BLOOD PRESSURE: 74 MMHG | WEIGHT: 270 LBS | BODY MASS INDEX: 44.98 KG/M2 | SYSTOLIC BLOOD PRESSURE: 126 MMHG

## 2022-06-06 DIAGNOSIS — N91.2 AMENORRHEA: Primary | ICD-10-CM

## 2022-06-06 PROCEDURE — 99213 OFFICE O/P EST LOW 20 MIN: CPT | Performed by: NURSE PRACTITIONER

## 2022-06-06 RX ORDER — MEDROXYPROGESTERONE ACETATE 10 MG/1
10 TABLET ORAL DAILY
Qty: 10 TABLET | Refills: 0 | OUTPATIENT
Start: 2022-06-06 | End: 2022-09-19

## 2022-06-06 NOTE — PROGRESS NOTES
Chief Complaint   Patient presents with   • Follow-up     Irregular menstruals        Subjective   HPI  Sabrina Bass is a 32 y.o. female, , who presents for amenorrhea.      She states she has experienced this problem for 3 months. She states that the problem is concerning.  The patient reports additional symptoms as none.      Her last menstrual period was ..  Periods are normally regular, monthly, approx every 28 days last 4 to 5 days. She stopped Nuva Ring 2021. Has been trying to conceive since 2021. She had no trouble conceiving her previous 2 children. Her last Covid vaccine was in 2022.   Dysmenorrhea:mild, occurring premenstrually and first 1-2 days of flow.  Patient reports problems with: none.  Partner Status: Marital Status: .  New Partners since last visit: no.  Desires STD Screening: no.    Additional OB/GYN History   Current contraception: contraceptive methods: None  Desires to: do not start contraception  Last Pap : neg HPV POOL: Neg  Last Completed Pap Smear          Ordered - PAP SMEAR (Yearly) Ordered on 2021  SCANNED - PAP SMEAR    2020  Pap IG, Rfx HPV ASCU    10/14/2019  Done - negative              History of abnormal Pap smear: no  Last mammogram:   Last Completed Mammogram     This patient has no relevant Health Maintenance data.        Tobacco Usage?: No   OB History        2    Para   2    Term   2            AB        Living   2       SAB        IAB        Ectopic        Molar        Multiple   0    Live Births   2                Health Maintenance   Topic Date Due   • ANNUAL PHYSICAL  Never done   • TDAP/TD VACCINES (1 - Tdap) Never done   • HEPATITIS C SCREENING  Never done   • INFLUENZA VACCINE  2022   • PAP SMEAR  2022   • Annual Gynecologic Pelvic and Breast Exam  2022   • COVID-19 Vaccine  Completed   • Pneumococcal Vaccine 0-64  Aged Out       The additional following portions of the  "patient's history were reviewed and updated as appropriate: allergies, current medications, past family history, past medical history, past social history and past surgical history.    Review of Systems   Constitutional: Negative.    Cardiovascular: Negative.    Gastrointestinal: Negative.    Genitourinary: Positive for menstrual problem ( amenorrhea).       I have reviewed and agree with the HPI, ROS, and historical information as entered above. Tona Klaus, APRN    Objective   /74 (BP Location: Right arm, Patient Position: Sitting, Cuff Size: Adult)   Ht 165.1 cm (65\")   Wt 122 kg (270 lb)   LMP 05/18/2022 (Approximate)   Breastfeeding No   BMI 44.93 kg/m²     Physical Exam  Vitals and nursing note reviewed. Exam conducted with a chaperone present.   Constitutional:       Appearance: Normal appearance. She is obese.   Abdominal:      Palpations: Abdomen is soft.      Tenderness: There is no abdominal tenderness.   Genitourinary:     General: Normal vulva.      Labia:         Right: No rash, tenderness or lesion.         Left: No rash.       Vagina: Normal. No foreign body. No vaginal discharge, erythema, tenderness, bleeding or prolapsed vaginal walls.      Cervix: No cervical motion tenderness, discharge, friability, lesion, erythema or cervical bleeding.      Uterus: Normal. Not enlarged and not tender.       Adnexa: Right adnexa normal and left adnexa normal.        Right: No mass, tenderness or fullness.          Left: No mass, tenderness or fullness.        Rectum: Normal. No external hemorrhoid.   Neurological:      Mental Status: She is alert.         Assessment & Plan     Assessment     Problem List Items Addressed This Visit    None     Visit Diagnoses     Amenorrhea    -  Primary    Relevant Orders    US Non-ob Transvaginal            Plan     Ultrasound for amenorrhea today. Ultrasound findings reviewed with pt. EMC= 6.1mm, Fluid noted within cervix.   2.   Will use condoms or abstain from " intercourse for 2 weeks and then take UPT. If UPT in negative in 2 weeks, can start provera 10mg daily x 10 days to try to induce a period. She is trying to conceive.       Tona Enrique, MANUELITO  06/06/2022

## 2022-07-06 DIAGNOSIS — N91.2 AMENORRHEA: ICD-10-CM

## 2022-07-06 RX ORDER — MEDROXYPROGESTERONE ACETATE 10 MG/1
TABLET ORAL
Qty: 10 TABLET | Refills: 0 | OUTPATIENT
Start: 2022-07-06

## 2022-07-06 NOTE — TELEPHONE ENCOUNTER
Patient is requesting a refill on medication. Upcoming appointment is on 01/09/2023. Last office visit was on 06/06/2022. Last annual was on 12/30/2021.

## 2022-09-19 PROCEDURE — 87086 URINE CULTURE/COLONY COUNT: CPT | Performed by: NURSE PRACTITIONER

## 2022-11-30 ENCOUNTER — TELEPHONE (OUTPATIENT)
Dept: OBSTETRICS AND GYNECOLOGY | Facility: CLINIC | Age: 32
End: 2022-11-30

## 2022-11-30 ENCOUNTER — OFFICE VISIT (OUTPATIENT)
Dept: OBSTETRICS AND GYNECOLOGY | Facility: CLINIC | Age: 32
End: 2022-11-30

## 2022-11-30 VITALS
DIASTOLIC BLOOD PRESSURE: 64 MMHG | HEIGHT: 65 IN | WEIGHT: 276 LBS | BODY MASS INDEX: 45.98 KG/M2 | SYSTOLIC BLOOD PRESSURE: 118 MMHG

## 2022-11-30 DIAGNOSIS — O20.0 THREATENED ABORTION: Primary | ICD-10-CM

## 2022-11-30 PROCEDURE — 99213 OFFICE O/P EST LOW 20 MIN: CPT | Performed by: NURSE PRACTITIONER

## 2022-11-30 NOTE — PROGRESS NOTES
"    Chief Complaint   Patient presents with   • Threatened Miscarriage          HPI  Sabrina Bass is a 32 y.o. female, , who presents with bleeding.  The amount of bleeding is described as heavy and pink spotting yesterday to increased thick/redish pink with cramping today.  for 1 days with clots starting none., cramping and U/S with gestational sac only. Menses are slightly irregular with sporadic long cycles. She is sure of her LMP.     Recent Tests:  She had a urine pregnancy test that was done 1 week ago that was positive..    US today: Yes.  Findings showed GS measuring 5w1d, no YS.  I have personally evaluated the U/S and agree with the findings. MANUELITO Miranda  She has not had prenatal care.  She denies associated abdominal or pelvic pain.. Her past medical history is non-contributory.  She does not have passage of tissue.  Rh Status: Positive  She reports no additional symptoms or complaints.    The additional following portions of the patient's history were reviewed and updated as appropriate: allergies and current medications.    Review of Systems   Constitutional: Negative.    Respiratory: Negative.    Cardiovascular: Negative.    Gastrointestinal: Negative.    Genitourinary: Positive for vaginal bleeding.        Mild cramping     All other systems reviewed and are negative.     I have reviewed and agree with the HPI, ROS, and historical information as entered above. Jayla Vegas, MANUELITO    Objective   /64 (BP Location: Right arm, Patient Position: Sitting, Cuff Size: Adult)   Ht 165.1 cm (65\")   Wt 125 kg (276 lb)   LMP 10/11/2022 (Exact Date)   BMI 45.93 kg/m²     Physical Exam  Constitutional:       Appearance: Normal appearance.   Pulmonary:      Effort: Pulmonary effort is normal.   Neurological:      Mental Status: She is alert.            Assessment and Plan    Problem List Items Addressed This Visit    None  Visit Diagnoses     Threatened     -  Primary "    Relevant Orders    US Ob Transvaginal        Early pregnancy vs miscarriage. U/S with GS measuring 5w1d, long cycles, sure of LMP. RH+    1. Threatened AB  2. Repeat U/S in 1 week(s).  3. Pelvic Rest.  No douching, intercourse or use of tampons.  4. Call for an increase in bleeding, abdominal pain, or fever.  Return in about 1 week (around 12/7/2022) for U/S TAB.    Counseling was given to patient and family for the following topics: diagnostic results, instructions for management and prognosis . Total time of the encounter was 20 minutes and 10 minutes was spent counseling.      Jayla Vegas, APRN  11/30/2022

## 2022-11-30 NOTE — TELEPHONE ENCOUNTER
AALIYAH patient   NOB: 12/20/2022 with AALIYAH  LMP: 10/11/2022, about 7w1d  MBT: O+    S/w patient- reports vaginal spotting yesterday AM. Patient states that when she wiped yesterday she noticed light pink vaginal spotting on the toilet paper. Patient states that she did not have any more vaginal spotting during the day yesterday. Patient states that she had bright red vaginal spotting this AM with wiping. Patient states that she has not had any more vaginal spotting since this AM. Patient denies pelvic pain or cramping. Patient denies recent IC, heavy lifting, or exercise. Patient reports straining with BM.     Per EF- patient to monitor vaginal bleeding, likely from straining. I advised patient to pelvic rest, start a daily stool softener, and monitor vaginal bleeding. Instructed patient to CB with increase vaginal bleeding, passage of tissue, or pelvic pain. She v/u.

## 2022-11-30 NOTE — TELEPHONE ENCOUNTER
CB Patient describes pink spotting yesterday to increase thick/red pink with cramping today. She left her job and is waiting in parking garage to be seen. Appt @ 1pm with ultrasound and jennifer lilly.

## 2022-11-30 NOTE — TELEPHONE ENCOUNTER
Patient is about 7 weeks pregnant, she said yesterday the first thing in the morning went to the bathroom and wiped and there was a pink color on the wipe then all day was fine, got up this morning and same thing but red, said this happen last time with her son and she ended up with a uti but didn't know it

## 2022-12-08 ENCOUNTER — OFFICE VISIT (OUTPATIENT)
Dept: OBSTETRICS AND GYNECOLOGY | Facility: CLINIC | Age: 32
End: 2022-12-08

## 2022-12-08 ENCOUNTER — TELEPHONE (OUTPATIENT)
Dept: OBSTETRICS AND GYNECOLOGY | Facility: CLINIC | Age: 32
End: 2022-12-08

## 2022-12-08 VITALS — SYSTOLIC BLOOD PRESSURE: 122 MMHG | DIASTOLIC BLOOD PRESSURE: 84 MMHG

## 2022-12-08 DIAGNOSIS — O02.0 BLIGHTED OVUM: Primary | ICD-10-CM

## 2022-12-08 PROCEDURE — 99213 OFFICE O/P EST LOW 20 MIN: CPT | Performed by: OBSTETRICS & GYNECOLOGY

## 2022-12-08 RX ORDER — MISOPROSTOL 200 UG/1
TABLET ORAL
Qty: 8 TABLET | Refills: 0 | Status: SHIPPED | OUTPATIENT
Start: 2022-12-08

## 2022-12-08 NOTE — TELEPHONE ENCOUNTER
Spoke with Piter. Piter placed Cytotec order and sent to pharmacy. Pt scheduled with 12/19/22 for follow up.

## 2022-12-08 NOTE — TELEPHONE ENCOUNTER
PATTIEA pt     Pt had appointment today for MAB. Silvestre already called in RX and scheduled appointment.

## 2022-12-08 NOTE — PROGRESS NOTES
Chief Complaint   Patient presents with   • Threatened Miscarriage          HPI  Sabrina Bass is a 32 y.o. female, , who presents with U/S with gestational sac only.    Recent Tests:  She had a urine pregnancy test that was done 2 weeks ago that was positive.    US today: Yes.  Findings showed blighted ovum.  Gestational sac without yolk sac or fetal pole..  I have personally evaluated the U/S and agree with the findings. Chio Dumas MD  She has had prenatal care, she was seen 1 week ago with a GS measuring 5w1d.  She denies associated abdominal or pelvic pain.. Her past medical history is notable for h/o thyromegaly that labs resolved after her last child in 2017.  She does not have passage of tissue. She passed 1 clot over the weekend. She has a h/o irregular, longer cycles with an unknown LMP.   Rh Status: Positive  She reports no additional symptoms or complaints.    The additional following portions of the patient's history were reviewed and updated as appropriate: allergies, current medications, past family history, past medical history, past social history, past surgical history and problem list.    Review of Systems   Constitutional: Negative.    HENT: Negative.    Eyes: Negative.    Respiratory: Negative.    Cardiovascular: Negative.    Gastrointestinal: Negative.    Endocrine: Negative.    Genitourinary: Positive for vaginal bleeding.   Musculoskeletal: Negative.    Skin: Negative.    Allergic/Immunologic: Negative.    Neurological: Negative.    Hematological: Negative.    Psychiatric/Behavioral: Positive for stress (circumstantial ).     All other systems reviewed and are negative.     I have reviewed and agree with the HPI, ROS, and historical information as entered above. Chio Dumas MD    Objective   /84     Physical Exam  Vitals and nursing note reviewed.   Constitutional:       Appearance: Normal appearance. She is well-developed.   HENT:      Head:  Normocephalic and atraumatic.   Pulmonary:      Effort: Pulmonary effort is normal.      Breath sounds: Normal breath sounds.   Abdominal:      General: There is no distension.      Palpations: Abdomen is soft. Abdomen is not rigid. There is no mass.      Tenderness: There is no abdominal tenderness. There is no guarding or rebound.   Musculoskeletal:      Cervical back: Normal range of motion.   Skin:     General: Skin is warm and dry.   Neurological:      Mental Status: She is alert and oriented to person, place, and time.   Psychiatric:         Mood and Affect: Mood normal.         Behavior: Behavior normal.            Assessment and Plan    Problem List Items Addressed This Visit    None  Visit Diagnoses     Blighted ovum    -  Primary          1. Blighted Ovum  2. Pelvic Rest.  No douching, intercourse or use of tampons.  3. Call for an increase in bleeding, abdominal pain, or fever.  4. Options discussed with patient.   5. Reviewed expectant management, Cytotec induction, D&C.  Reviewed the pros and cons of each.  Patient visibly upset and unable to make a decision at this time.  She will take some time with her  to discuss and call the office treatment option.  If she chooses dilation and curettage, we will schedule.  If she decides Cytotec, we will call in the medicine and have the patient return to clinic in 1 week for evaluation  Return for We will schedule follow-up once patient decides treatment option.    Counseling was given to patient and family for the following topics: diagnostic results, instructions for management, risk factor reductions, prognosis, impressions and risks and benefits of treatment options . Total time of the encounter was 30 minutes.    Added: Patient elected for Cytotec induction.  We will prescribe to [facility] pharmacy in [place] and have patient return to clinic in 1 week for an ultrasound to ensure passage of tissue.  Chio Dumas MD  12/08/2022

## 2022-12-14 DIAGNOSIS — O02.0 BLIGHTED OVUM: Primary | ICD-10-CM

## 2022-12-19 ENCOUNTER — OFFICE VISIT (OUTPATIENT)
Dept: OBSTETRICS AND GYNECOLOGY | Facility: CLINIC | Age: 32
End: 2022-12-19

## 2022-12-19 VITALS
SYSTOLIC BLOOD PRESSURE: 116 MMHG | WEIGHT: 271.6 LBS | HEIGHT: 65 IN | DIASTOLIC BLOOD PRESSURE: 64 MMHG | BODY MASS INDEX: 45.25 KG/M2

## 2022-12-19 DIAGNOSIS — O03.9 SPONTANEOUS MISCARRIAGE: Primary | ICD-10-CM

## 2022-12-19 PROCEDURE — 99215 OFFICE O/P EST HI 40 MIN: CPT | Performed by: NURSE PRACTITIONER

## 2022-12-19 NOTE — PROGRESS NOTES
"    Chief Complaint   Patient presents with   • Follow-up     [facility]  Sabrina Bass is a 32 y.o. female, , who presents for follow up on a Missed AB. At her last visit she chose to use cytotec for treatment. Since then she has passage of tissue, but not sure. Patient reports she had cramping for about 30 mins, felt like BM coming out of her vagina.\" Her bleeding today is none. She denies associated abdominal or pelvic pain.. Her past medical history is non-contributory. She reports no additional symptoms or complaints.    Recent Tests:  US today: yes  Rh Status: Positive      The additional following portions of the patient's history were reviewed and updated as appropriate: allergies and current medications.    Review of Systems   Constitutional: Negative.    Cardiovascular: Negative.    Gastrointestinal: Negative.    Genitourinary: Negative.          I have reviewed and agree with the HPI, ROS, and historical information as entered above. Tona Enrique, APRN    Objective   /64 (BP Location: Right arm, Patient Position: Sitting, Cuff Size: Adult)   Ht 165.1 cm (65\")   Wt 123 kg (271 lb 9.6 oz)   LMP  (LMP Unknown)   BMI 45.20 kg/m²     Physical Exam  Vitals and nursing note reviewed.   Constitutional:       Appearance: Normal appearance.   Neurological:      Mental Status: She is alert.            Assessment and Plan    Problem List Items Addressed This Visit    None  Visit Diagnoses     Spontaneous miscarriage    -  Primary          1. Ultrasound findings reviewed with pt. Blighted ovum as previously seen on last ultrasound is no longer present. EMC 4.6mm  2. MBT= O positive  3. Patient plans to wait a few months before trying to conceive again. Will stay on PNVs.       Counseling was given to patient for the following topics: diagnostic results and instructions for management . Total time of the encounter was 45 minutes and 10 minutes was spend counseling.      Toan Enrique, " APRN  12/19/2022

## 2023-05-10 ENCOUNTER — INITIAL PRENATAL (OUTPATIENT)
Dept: OBSTETRICS AND GYNECOLOGY | Facility: CLINIC | Age: 33
End: 2023-05-10
Payer: COMMERCIAL

## 2023-05-10 VITALS — DIASTOLIC BLOOD PRESSURE: 70 MMHG | WEIGHT: 270 LBS | BODY MASS INDEX: 44.93 KG/M2 | SYSTOLIC BLOOD PRESSURE: 118 MMHG

## 2023-05-10 DIAGNOSIS — Z98.891 HISTORY OF CESAREAN SECTION: ICD-10-CM

## 2023-05-10 DIAGNOSIS — E01.0 THYROMEGALY: ICD-10-CM

## 2023-05-10 DIAGNOSIS — O99.210 OBESITY IN PREGNANCY, ANTEPARTUM: ICD-10-CM

## 2023-05-10 DIAGNOSIS — Z34.90 PRENATAL CARE, ANTEPARTUM: Primary | ICD-10-CM

## 2023-05-10 PROBLEM — E66.01 MORBID OBESITY WITH BMI OF 40.0-44.9, ADULT: Status: RESOLVED | Noted: 2021-12-30 | Resolved: 2023-05-10

## 2023-05-10 RX ORDER — PRENATAL 168/IRON/FOLIC/OMEGA3 27-800-235
CAPSULE ORAL
COMMUNITY
Start: 2023-04-09

## 2023-05-10 RX ORDER — DEXTROMETHORPHAN HYDROBROMIDE AND PROMETHAZINE HYDROCHLORIDE 15; 6.25 MG/5ML; MG/5ML
SYRUP ORAL
COMMUNITY
Start: 2023-01-31 | End: 2023-05-10

## 2023-05-10 NOTE — PROGRESS NOTES
Initial ob visit     CC- Here for care of pregnancy        Sabrina Bass is a 33 y.o. female, , who presents for her first obstetrical visit.  Her last LMP was Patient's last menstrual period was 2023.. Her IVETH is 2023, by Ultrasound. Current GA is 8w2d.     Initial positive test date : 23, UPT        Her periods are: every 5 weeks  Prior obstetric issues: thyromegaly, placental abruption  Patient's past medical history is significant for: none.  Family history of genetic issues (includes FOB): none  Prior infections concerning in pregnancy (Rash, fever in last 2 weeks): No  Varicella Hx - history of chicken pox  Prior testing for Cystic Fibrosis Carrier or Sickle Cell Trait- none  Prepregnancy BMI - Body mass index is 44.93 kg/m².  History of STD: no  Hx of HSV for patient or partner: no  Ultrasound Today: yes    OB History    Para Term  AB Living   3 2 2     2   SAB IAB Ectopic Molar Multiple Live Births           0 2      # Outcome Date GA Lbr Marcus/2nd Weight Sex Delivery Anes PTL Lv   3 Current            2 Term 17 37w1d  4531 g (9 lb 15.8 oz) M CS-LTranv Spinal  STEPHANI   1 Term 13 39w0d  4593 g (10 lb 2 oz) F CS-LTranv Spinal N STEPHANI       Additional Pertinent History   Last Pap : 21 Result: negative HPV: negative     Last Completed Pap Smear     This patient has no relevant Health Maintenance data.        History of abnormal Pap smear: no  Family history of uterine, colon, breast, or ovarian cancer: yes - Great Grandfather- colon  Feelings of Anxiety or Depression: no  Tobacco Usage?: No   Alcohol/Drug Use?: NO  Over the age of 35 at delivery: no  Desires Genetic Screening: None      PMH    Current Outpatient Medications:   •  Prenat-Fe Carbonyl-FA-Omega 3 (One-A-Day Womens Prenatal 1) 28-0.8-235 MG capsule, , Disp: , Rfl:      Past Medical History:   Diagnosis Date   • History of varicella as a child    • Thyromegaly 10/08/2018    REVIEWED  HISTORICALLY REFERRED TO [facility], BUT PT STATES SHE NEVER GOT SENT A FORMAL REFERRRAL. LAB WORK NORMALIZED POST PREGNANCY   • Varicella     When I was a child        Past Surgical History:   Procedure Laterality Date   • ANAL FISSURECTOMY  2021   •  SECTION     •  SECTION N/A 2017    Procedure:  SECTION REPEAT;  Surgeon: Chio Dumas MD;  Location: [facility] LABOR DELIVERY;  Service:    • COLONOSCOPY W/ BIOPSIES  2020    sessile rectal polyp removed, internal hemorrhoids   • HEMORRHOIDECTOMY  2021   • WISDOM TOOTH EXTRACTION         Review of Systems   Review of Systems  Patient Reports: Nausea, Cramping and Fatigue  Patient Denies: Nausea and vomiting, Spotting and Heavy bleeding  All systems reviewed and otherwise normal.    I have reviewed and agree with the HPI, ROS, and historical information as entered above. Chio Dumas MD    /70   Wt 122 kg (270 lb)   LMP 2023   BMI 44.93 kg/m²     The additional following portions of the patient's history were reviewed and updated as appropriate: allergies, current medications, past family history, past medical history, past social history, past surgical history and problem list.    Physical Exam  General:  well developed; well nourished  no acute distress   Chest/Respiratory: No labored breathing, normal respiratory effort, normal appearance, no respiratory noises noted   Heart:  normal rate, regular rhythm,  no murmurs, rubs, or gallops   Thyroid: enlarged   Breasts:  Not performed.   Abdomen: soft, non-tender; no masses  no umbilical or inguinal hernias are present  no hepato-splenomegaly   Pelvis: Not performed.        Assessment and Plan    Problem List Items Addressed This Visit        Endocrine and Metabolic    Thyromegaly    Relevant Orders    TSH    Ambulatory Referral to Endocrinology       Gravid and     Obesity in pregnancy, antepartum    Overview     Hemoglobin A1c at new  OB  Early Glucola  Recommend baby aspirin weeks 12 through 36  Growth ultrasounds at 32 and 37 weeks  Recommend carbohydrate control, daily walking, water intake         History of  section    Overview     X2.  Desires repeat         Prenatal care, antepartum - Primary    Relevant Orders    Hemoglobin A1c    Obstetric Panel    HIV-1 / O / 2 Ag / Antibody 4th Generation    Urine Culture - Urine, Urine, Clean Catch    Chlamydia trachomatis, Neisseria gonorrhoeae, PCR - Urine, Urine, Random Void       1. Pregnancy at 8w2d  2. Reviewed routine prenatal care with the office and educational materials given  3. Lab(s) Ordered  4. Discussed options for genetic testing including first trimester nuchal translucency screen, genetic disease carrier testing, quadruple screen, and NIPT  5. Patient is on Prenatal vitamins  6. Activity recommendation : 150 minutes/week of moderate intensity aerobic activity unless we limit for bleeding, hypertension or other pregnancy complication   7. Recommend limiting weight gain to 15 to 20 pounds in pregnancy.   8. Discussed carbohydrate control.   9. hgb A1C today  10. TFT today  11. Recommended early 1 hr gtt next visit  12. discussed baby aspirin from 10-36 weeks for prevention of preeclampsia   Return in about 4 weeks (around 2023) for glucola.      Chio Dumas MD  05/10/2023

## 2023-05-12 LAB
ABO GROUP BLD: NORMAL
BACTERIA UR CULT: NORMAL
BACTERIA UR CULT: NORMAL
BASOPHILS # BLD AUTO: 0 X10E3/UL (ref 0–0.2)
BASOPHILS NFR BLD AUTO: 1 %
BLD GP AB SCN SERPL QL: NEGATIVE
C TRACH RRNA SPEC QL NAA+PROBE: NEGATIVE
EOSINOPHIL # BLD AUTO: 0.1 X10E3/UL (ref 0–0.4)
EOSINOPHIL NFR BLD AUTO: 1 %
ERYTHROCYTE [DISTWIDTH] IN BLOOD BY AUTOMATED COUNT: 12.1 % (ref 11.7–15.4)
HBA1C MFR BLD: 6.2 % (ref 4.8–5.6)
HBV SURFACE AG SERPL QL IA: NEGATIVE
HCT VFR BLD AUTO: 42.4 % (ref 34–46.6)
HCV IGG SERPL QL IA: NON REACTIVE
HGB BLD-MCNC: 14.4 G/DL (ref 11.1–15.9)
HIV 1+2 AB+HIV1 P24 AG SERPL QL IA: NON REACTIVE
IMM GRANULOCYTES # BLD AUTO: 0.1 X10E3/UL (ref 0–0.1)
IMM GRANULOCYTES NFR BLD AUTO: 1 %
LYMPHOCYTES # BLD AUTO: 1.8 X10E3/UL (ref 0.7–3.1)
LYMPHOCYTES NFR BLD AUTO: 21 %
MCH RBC QN AUTO: 31.9 PG (ref 26.6–33)
MCHC RBC AUTO-ENTMCNC: 34 G/DL (ref 31.5–35.7)
MCV RBC AUTO: 94 FL (ref 79–97)
MONOCYTES # BLD AUTO: 0.6 X10E3/UL (ref 0.1–0.9)
MONOCYTES NFR BLD AUTO: 7 %
N GONORRHOEA RRNA SPEC QL NAA+PROBE: NEGATIVE
NEUTROPHILS # BLD AUTO: 5.9 X10E3/UL (ref 1.4–7)
NEUTROPHILS NFR BLD AUTO: 69 %
PLATELET # BLD AUTO: 234 X10E3/UL (ref 150–450)
RBC # BLD AUTO: 4.51 X10E6/UL (ref 3.77–5.28)
RH BLD: POSITIVE
RPR SER QL: NON REACTIVE
RUBV IGG SERPL IA-ACNC: 1.33 INDEX
TSH SERPL DL<=0.005 MIU/L-ACNC: 0.21 UIU/ML (ref 0.27–4.2)
WBC # BLD AUTO: 8.4 X10E3/UL (ref 3.4–10.8)

## 2023-05-15 NOTE — PROGRESS NOTES
Please follow-up on endocrine appointment.  Please contact patient and let her know that her A1c is elevated and concerning for prediabetes.  Need to get 1 hour Glucola testing as soon as possible.  Patient also needs to follow-up with endocrine regarding her TSH.

## 2023-05-25 ENCOUNTER — LAB (OUTPATIENT)
Dept: OBSTETRICS AND GYNECOLOGY | Facility: CLINIC | Age: 33
End: 2023-05-25
Payer: COMMERCIAL

## 2023-05-25 DIAGNOSIS — R73.09 ELEVATED HEMOGLOBIN A1C: ICD-10-CM

## 2023-05-25 DIAGNOSIS — O99.210 OBESITY IN PREGNANCY, ANTEPARTUM: Primary | ICD-10-CM

## 2023-05-26 LAB — GLUCOSE 1H P 50 G GLC PO SERPL-MCNC: 149 MG/DL (ref 65–139)

## 2023-06-07 ENCOUNTER — ROUTINE PRENATAL (OUTPATIENT)
Dept: OBSTETRICS AND GYNECOLOGY | Facility: CLINIC | Age: 33
End: 2023-06-07
Payer: COMMERCIAL

## 2023-06-07 VITALS — BODY MASS INDEX: 44.33 KG/M2 | SYSTOLIC BLOOD PRESSURE: 116 MMHG | DIASTOLIC BLOOD PRESSURE: 72 MMHG | WEIGHT: 266.4 LBS

## 2023-06-07 DIAGNOSIS — Z34.90 PRENATAL CARE, ANTEPARTUM: Primary | ICD-10-CM

## 2023-06-07 DIAGNOSIS — B37.9 YEAST INFECTION: ICD-10-CM

## 2023-06-07 DIAGNOSIS — E01.0 THYROMEGALY: ICD-10-CM

## 2023-06-07 DIAGNOSIS — Z98.891 HISTORY OF CESAREAN SECTION: ICD-10-CM

## 2023-06-07 DIAGNOSIS — R73.09 ELEVATED HEMOGLOBIN A1C: ICD-10-CM

## 2023-06-07 DIAGNOSIS — O99.210 OBESITY IN PREGNANCY, ANTEPARTUM: ICD-10-CM

## 2023-06-07 PROCEDURE — 0502F SUBSEQUENT PRENATAL CARE: CPT | Performed by: OBSTETRICS & GYNECOLOGY

## 2023-06-07 RX ORDER — DIPHENHYDRAMINE HCL 50 MG
CAPSULE ORAL
COMMUNITY
Start: 2023-05-10

## 2023-06-07 RX ORDER — PYRIDOXINE HCL (VITAMIN B6) 100 MG
TABLET ORAL
COMMUNITY
Start: 2023-05-10

## 2023-06-07 RX ORDER — NYSTATIN 100000 [USP'U]/G
POWDER TOPICAL 2 TIMES DAILY
Qty: 30 G | Refills: 0 | Status: SHIPPED | OUTPATIENT
Start: 2023-06-07

## 2023-06-07 NOTE — PROGRESS NOTES
OB FOLLOW UP  CC- Here for care of pregnancy        Sabrina Bass is a 33 y.o.  12w2d patient being seen today for her obstetrical follow up visit. Patient reports occasional nausea and headaches. Pt also reports round ligament pain.     Pt states she has appointment with endocrine next Thursday regarding her TSH.     Pt is undergoing 3 hr glucola testing today.     Her prenatal care is complicated by (and status) :   Patient Active Problem List   Diagnosis    History of anal fissures    Obesity in pregnancy, antepartum    History of  section    Prenatal care, antepartum    Thyromegaly       Desires genetic testing?: No  Ultrasound Today: No    ROS -   Patient Denies: Loss of Fluid, Vaginal Spotting, Vision Changes, and Vomiting   Fetal Movement :  absent  All other systems reviewed and are negative.     The additional following portions of the patient's history were reviewed and updated as appropriate: allergies and current medications.    I have reviewed and agree with the HPI, ROS, and historical information as entered above. Chio Dumas MD          /72   Wt 121 kg (266 lb 6.4 oz)   LMP 2023   BMI 44.33 kg/m²         EXAM:     Prenatal Vitals  BP: 116/72  Weight: 121 kg (266 lb 6.4 oz)   Fetal Heart Rate: 151      Yeast noted in fold of pannus.            Assessment and Plan    Problem List Items Addressed This Visit          Endocrine and Metabolic    Thyromegaly    Overview     TSH decreased.  Recommend endocrine follow-up.            Gravid and     Obesity in pregnancy, antepartum    Overview     Hemoglobin A1c at new OB 6.2  Early Glucola 149-needs 3-hour testing.  Recommend baby aspirin weeks 12 through 36  Growth ultrasounds at 32 and 37 weeks  Recommend carbohydrate control, daily walking, water intake         History of  section    Overview     X2.  Desires repeat    Repeat scheduled on 2023 at 7:30 AM         Prenatal care,  antepartum - Primary    Relevant Orders    Gestational Screen 3 Hr ([facility])    POC Urinalysis Dipstick     Other Visit Diagnoses       Elevated hemoglobin A1c        Yeast infection        Relevant Medications    nystatin (MYCOSTATIN) 464276 UNIT/GM powder            Pregnancy at 12w2d  Labs reviewed from New OB Visit.  Yeast noted in pannus fold.  Nystatin provided today.  Counseled on genetic testing, carrier status and option for NT screen  Activity and Exercise discussed.  Patient is on Prenatal vitamins  Follow-up with endocrine next week.  We will follow-up on 3 over glucose tolerance test today.  Return in about 4 weeks (around 7/5/2023).    Chio Dumas MD  06/07/2023

## 2023-06-08 LAB
GLUCOSE 1H P 100 G GLC PO SERPL-MCNC: 216 MG/DL (ref 65–179)
GLUCOSE 2H P 100 G GLC PO SERPL-MCNC: 152 MG/DL (ref 65–154)
GLUCOSE 3H P 100 G GLC PO SERPL-MCNC: 147 MG/DL (ref 65–139)
GLUCOSE P FAST SERPL-MCNC: 102 MG/DL (ref 65–94)

## 2023-06-09 PROBLEM — O24.410 DIET CONTROLLED GESTATIONAL DIABETES MELLITUS (GDM), ANTEPARTUM: Status: ACTIVE | Noted: 2023-06-09

## 2023-06-09 NOTE — PROGRESS NOTES
Chief complaint/Reason for consult: Abnormal thyroid labs and glucose during pregnancy    Consult requested by Chio Dumas MD    HPI: Ms. Bass is a 33-year-old female currently pregnant who comes for consultation of enlarged thyroid and abnormal glucoses during pregnancy.    Currently 13w3d pregnant (male)   Prior pregnancies: 2 full term without GDM   Patient had 100g OGTT done early showin2023  Fasting glucose 102  1 hour 216mg/dl   2 hour 152mg/dl   3 hour 147mg/dl     Hemoglobin A1c 6.2% on 5/10/2023    # Pregnancy complicated by pre-existing diabetes without complications  - Diagnosed: 2023 at 12w gestation based on abnormal 3h OGTT   - Has strong family history of T2DM on paternal side   - Current regimen includes: Diet control  - HbA1c: 6.2% on 5/10/2023  - Not checking FSBG at this time   - No issues with pregnancy at this time other than nausea   - Patient denies neuropathy   - Patient denies gastroparesis   - Patient without known ASCVD   - Blood pressure today 122/62   - Reports current diet consists of 1-2 meals per day and occasionally snacks, having bothersome nausea early in the morning and late at night   - Meals consist of variety of foods, typically high carbohydrates (bread and potatoes)   - Drinks 1 regular soda per day   - Physical activity is minimal; no particular reason at this time     # Thyromegaly   - TSH 0.213 done 5/10/2023  - Not on thyroid hormone replacement   - No known family history of thyroid problems     Past medical history, past surgical history, family history and social history reviewed within this encounter.     Review of Systems   Constitutional:  Positive for appetite change. Negative for activity change.   HENT:  Negative for trouble swallowing and voice change.    Eyes:  Negative for visual disturbance.   Respiratory:  Negative for shortness of breath.    Cardiovascular:  Negative for chest pain.   Gastrointestinal:  Positive for abdominal distention  "and nausea.   Endocrine: Negative for cold intolerance and heat intolerance.   Musculoskeletal:  Negative for arthralgias and gait problem.   Skin:  Negative for rash.   Neurological:  Negative for numbness.   Psychiatric/Behavioral:  Negative for agitation and confusion.       /62   Pulse 83   Ht 165.1 cm (65\")   Wt 120 kg (265 lb)   LMP 03/05/2023   SpO2 98%   BMI 44.10 kg/m²      Physical Exam  Vitals reviewed.   Constitutional:       General: She is not in acute distress.     Appearance: She is obese.   HENT:      Head: Normocephalic.      Nose: Nose normal.   Eyes:      Conjunctiva/sclera: Conjunctivae normal.   Neck:      Comments: Diffusely enlarged without discrete nodules palpated  Cardiovascular:      Rate and Rhythm: Normal rate.      Pulses: Normal pulses.   Pulmonary:      Effort: Pulmonary effort is normal.   Abdominal:      Tenderness: There is no guarding.   Musculoskeletal:         General: Normal range of motion.      Cervical back: Neck supple.   Skin:     General: Skin is warm and dry.   Neurological:      Mental Status: She is alert and oriented to person, place, and time.   Psychiatric:         Mood and Affect: Mood normal.         Behavior: Behavior normal.         Thought Content: Thought content normal.        Labs and images reviewed as noted in the HPI    Assessment and plan:    Diagnoses and all orders for this visit:    1. Pregnancy complicated by pre-existing type 1 diabetes in first trimester (Primary)  Assessment & Plan:  -Patient likely with pre-existing diabetes or impaired fasting glucose as her 3-hour OGTT was abnormal during the 12th week of pregnancy  -Discussed the importance of diet changes and increase physical activity to help manage her glucoses at this time  -Discussed BG goals during pregnancy (fasting <95, 1 hr post prandial <140, 2 hr post prandial < 120). Pt to instructed to check pre- and post-meal and at bedtime, keep log, and email me log weekly "   -Discussed carb counting and diet restrictions per meal. Carb allowance per meal given to patient.   -Written instructions given to patient   -Discussed risks of uncontrolled GDM to the baby including: Excessive birth weight; Early () birth; respiratory distress syndrome; hypoglycemia; Obesity and type 2 diabetes later in life; and Stillbirth. And discussed risks of uncontrolled GDM to the mother including: High blood pressure and preeclampsia; Having a ; and future diabetes.   -Patient needs to have repeat screening for diabetes after delivery  -Diabetes education ordered     Orders:  -     Blood Glucose Monitoring Suppl device; Using to test glucose.  Dispense: 1 each; Refill: 0  -     glucose blood test strip; Testing 4 times daily.  Dispense: 150 each; Refill: 5  -     Lancets 33G misc; 1 each 4 (Four) Times a Day.  Dispense: 150 each; Refill: 3  -     POC Glucose, Blood  -     Ambulatory Referral to Diabetic Education  -     TSH  -     T4, Free  -     T3, Free    2. Thyromegaly  Assessment & Plan:  -Patient without discrete nodules palpated on exam, will consider getting a thyroid ultrasound for change in compressive symptoms or change in physical exam at upcoming appointment  -Her TSH is slightly low which could be normal change during pregnancy as she is not demonstrating significant symptoms of thyrotoxicosis  -Recommend checking TSH, free T4 and free T3 today    Orders:  -     TSH; Future  -     T4, Free; Future  -     T3, Free; Future       Return in about 1 week (around 2023) for GDM .     Electronically signed by: Kyle S Rosenstein, MD   Addendum  Labs 6/15/2023  TSH 0.114  Free T4 1.05  Free T3 3.60  These labs are normal for first trimester pregnancy as the normal reference range for TSH during the first trimester is 0.1-2.5 and her free T4 and free T3 are within normal limits.  Recommend continuing to monitor once per trimester and then 3 to 6 months after delivery.   EMS

## 2023-06-09 NOTE — PROGRESS NOTES
Please call patient and let her know that she tested positive for diabetes.  She is to see an endocrinologist on the 15th.  Please make sure that our office calls them to let them know that it is also to be seen and managed for diabetes.  This is more than likely pregestational diabetes or type 2 diabetes secondary to it being so early in the pregnancy.

## 2023-06-15 ENCOUNTER — OFFICE VISIT (OUTPATIENT)
Dept: ENDOCRINOLOGY | Facility: CLINIC | Age: 33
End: 2023-06-15
Payer: COMMERCIAL

## 2023-06-15 VITALS
HEIGHT: 65 IN | BODY MASS INDEX: 44.15 KG/M2 | OXYGEN SATURATION: 98 % | DIASTOLIC BLOOD PRESSURE: 62 MMHG | WEIGHT: 265 LBS | HEART RATE: 83 BPM | SYSTOLIC BLOOD PRESSURE: 122 MMHG

## 2023-06-15 DIAGNOSIS — E01.0 THYROMEGALY: ICD-10-CM

## 2023-06-15 DIAGNOSIS — O24.011 PREGNANCY COMPLICATED BY PRE-EXISTING TYPE 1 DIABETES IN FIRST TRIMESTER: Primary | ICD-10-CM

## 2023-06-15 PROBLEM — O24.410 DIET CONTROLLED GESTATIONAL DIABETES MELLITUS (GDM), ANTEPARTUM: Status: RESOLVED | Noted: 2023-06-09 | Resolved: 2023-06-15

## 2023-06-15 LAB — GLUCOSE BLDC GLUCOMTR-MCNC: 82 MG/DL (ref 70–130)

## 2023-06-15 PROCEDURE — 84439 ASSAY OF FREE THYROXINE: CPT | Performed by: HOSPITALIST

## 2023-06-15 PROCEDURE — 84481 FREE ASSAY (FT-3): CPT | Performed by: HOSPITALIST

## 2023-06-15 PROCEDURE — 84443 ASSAY THYROID STIM HORMONE: CPT | Performed by: HOSPITALIST

## 2023-06-15 RX ORDER — LANCETS 33 GAUGE
1 EACH MISCELLANEOUS 4 TIMES DAILY
Qty: 150 EACH | Refills: 3 | Status: SHIPPED | OUTPATIENT
Start: 2023-06-15

## 2023-06-15 NOTE — ASSESSMENT & PLAN NOTE
-Patient likely with pre-existing diabetes or impaired fasting glucose as her 3-hour OGTT was abnormal during the 12th week of pregnancy  -Discussed the importance of diet changes and increase physical activity to help manage her glucoses at this time  -Discussed BG goals during pregnancy (fasting <95, 1 hr post prandial <140, 2 hr post prandial < 120). Pt to instructed to check pre- and post-meal and at bedtime, keep log, and email me log weekly   -Discussed carb counting and diet restrictions per meal. Carb allowance per meal given to patient.   -Written instructions given to patient   -Discussed risks of uncontrolled GDM to the baby including: Excessive birth weight; Early () birth; respiratory distress syndrome; hypoglycemia; Obesity and type 2 diabetes later in life; and Stillbirth. And discussed risks of uncontrolled GDM to the mother including: High blood pressure and preeclampsia; Having a ; and future diabetes.   -Patient needs to have repeat screening for diabetes after delivery  -Diabetes education ordered

## 2023-06-15 NOTE — ASSESSMENT & PLAN NOTE
-Patient without discrete nodules palpated on exam, will consider getting a thyroid ultrasound for change in compressive symptoms or change in physical exam at upcoming appointment  -Her TSH is slightly low which could be normal change during pregnancy as she is not demonstrating significant symptoms of thyrotoxicosis  -Recommend checking TSH, free T4 and free T3 today

## 2023-06-16 LAB
T3FREE SERPL-MCNC: 3.6 PG/ML (ref 2–4.4)
T4 FREE SERPL-MCNC: 1.05 NG/DL (ref 0.93–1.7)
TSH SERPL DL<=0.05 MIU/L-ACNC: 0.11 UIU/ML (ref 0.27–4.2)

## 2023-06-21 PROBLEM — O24.111 PREGNANCY COMPLICATED BY PRE-EXISTING TYPE 2 DIABETES IN FIRST TRIMESTER: Status: ACTIVE | Noted: 2023-06-15

## 2023-06-29 PROBLEM — O24.112 PREGNANCY COMPLICATED BY PRE-EXISTING TYPE 2 DIABETES IN SECOND TRIMESTER: Status: ACTIVE | Noted: 2023-06-15

## 2023-07-06 PROBLEM — O24.112 PREGNANCY COMPLICATED BY PRE-EXISTING TYPE 2 DIABETES, SECOND TRIMESTER: Status: ACTIVE | Noted: 2023-07-06

## 2023-08-02 ENCOUNTER — ROUTINE PRENATAL (OUTPATIENT)
Dept: OBSTETRICS AND GYNECOLOGY | Facility: CLINIC | Age: 33
End: 2023-08-02
Payer: COMMERCIAL

## 2023-08-02 VITALS — WEIGHT: 262.6 LBS | SYSTOLIC BLOOD PRESSURE: 116 MMHG | DIASTOLIC BLOOD PRESSURE: 78 MMHG | BODY MASS INDEX: 43.7 KG/M2

## 2023-08-02 DIAGNOSIS — O24.112 PREGNANCY COMPLICATED BY PRE-EXISTING TYPE 2 DIABETES IN SECOND TRIMESTER: ICD-10-CM

## 2023-08-02 DIAGNOSIS — O99.210 OBESITY IN PREGNANCY, ANTEPARTUM: ICD-10-CM

## 2023-08-02 DIAGNOSIS — Z34.90 PRENATAL CARE, ANTEPARTUM: Primary | ICD-10-CM

## 2023-08-02 DIAGNOSIS — Z98.891 HISTORY OF CESAREAN SECTION: ICD-10-CM

## 2023-08-02 LAB
GLUCOSE UR STRIP-MCNC: NEGATIVE MG/DL
PROT UR STRIP-MCNC: NEGATIVE MG/DL

## 2023-08-30 ENCOUNTER — TELEPHONE (OUTPATIENT)
Dept: OBSTETRICS AND GYNECOLOGY | Facility: CLINIC | Age: 33
End: 2023-08-30
Payer: COMMERCIAL

## 2023-08-30 ENCOUNTER — ROUTINE PRENATAL (OUTPATIENT)
Dept: OBSTETRICS AND GYNECOLOGY | Facility: CLINIC | Age: 33
End: 2023-08-30
Payer: COMMERCIAL

## 2023-08-30 VITALS — WEIGHT: 262.4 LBS | DIASTOLIC BLOOD PRESSURE: 62 MMHG | SYSTOLIC BLOOD PRESSURE: 104 MMHG | BODY MASS INDEX: 43.67 KG/M2

## 2023-08-30 DIAGNOSIS — Z34.90 PRENATAL CARE, ANTEPARTUM: Primary | ICD-10-CM

## 2023-08-30 DIAGNOSIS — O24.112 PREGNANCY COMPLICATED BY PRE-EXISTING TYPE 2 DIABETES IN SECOND TRIMESTER: ICD-10-CM

## 2023-08-30 PROCEDURE — 0502F SUBSEQUENT PRENATAL CARE: CPT | Performed by: ADVANCED PRACTICE MIDWIFE

## 2023-08-30 NOTE — PROGRESS NOTES
OB FOLLOW UP  CC- Here for care of pregnancy        Sabrina Bass is a 33 y.o.  24w2d patient being seen today for a fall.    Patient reports that she was in the process of sitting down, when her chair rolled away from her. She turned to grab it and fell onto her left hip.  She did not hit her stomach or side when she fell.   No leaking of fluid, bleeding, or cramping. Feeling good movement.    Her prenatal care is complicated by (and status) :    Patient Active Problem List   Diagnosis    History of anal fissures    Obesity in pregnancy, antepartum    History of  section    Prenatal care, antepartum    Thyromegaly    Pregnancy complicated by pre-existing type 2 diabetes in second trimester    Pregnancy complicated by pre-existing type 2 diabetes, second trimester         Ultrasound Today: No.    ROS -   Patient Reports :  fall  Patient Denies: Loss of Fluid, Vaginal Spotting, Vision Changes, Headaches, Nausea , Vomiting , Contractions, and Epigastric pain  Fetal Movement : normal  All other systems reviewed and are negative.       The additional following portions of the patient's history were reviewed and updated as appropriate: allergies and current medications.    I have reviewed and agree with the HPI, ROS, and historical information as entered above. MANUELITO Amato      /62   Wt 119 kg (262 lb 6.4 oz)   LMP 2023   BMI 43.67 kg/mý       EXAM:     Prenatal Vitals  BP: 104/62  Weight: 119 kg (262 lb 6.4 oz)   Fetal Heart Rate: 153                Assessment and Plan    Problem List Items Addressed This Visit    None      Pregnancy at 24w2d  Fetal status reassuring.   Since pt did not hit her stomach and fell from a sitting height, does not need extra monitoring today. Will monitor for cramping, bleeding, or leaking of fluid.  Advised warm tub bath, heating pad to low back only  Return for regularly scheduled OB appointment.  RTC for worsening symptoms      Alisha Bucio  APRN  08/30/2023

## 2023-08-30 NOTE — TELEPHONE ENCOUNTER
Pt called back and states she turned to grab the chair and fell onto her hip. Recommended she come in for eval at 2 due to the possibility of needing an US and there is an opening at 2:30. She states she will need to see if someone can watch her class, she will call back to schedule.

## 2023-09-01 ENCOUNTER — ROUTINE PRENATAL (OUTPATIENT)
Dept: OBSTETRICS AND GYNECOLOGY | Facility: CLINIC | Age: 33
End: 2023-09-01
Payer: COMMERCIAL

## 2023-09-01 VITALS — WEIGHT: 261 LBS | DIASTOLIC BLOOD PRESSURE: 66 MMHG | BODY MASS INDEX: 43.43 KG/M2 | SYSTOLIC BLOOD PRESSURE: 116 MMHG

## 2023-09-01 DIAGNOSIS — O99.210 OBESITY IN PREGNANCY, ANTEPARTUM: ICD-10-CM

## 2023-09-01 DIAGNOSIS — Z98.891 HISTORY OF CESAREAN SECTION: ICD-10-CM

## 2023-09-01 DIAGNOSIS — O24.112 PREGNANCY COMPLICATED BY PRE-EXISTING TYPE 2 DIABETES, SECOND TRIMESTER: ICD-10-CM

## 2023-09-01 DIAGNOSIS — Z87.19 HISTORY OF ANAL FISSURES: ICD-10-CM

## 2023-09-01 DIAGNOSIS — Z34.90 PRENATAL CARE, ANTEPARTUM: Primary | ICD-10-CM

## 2023-09-01 LAB
GLUCOSE UR STRIP-MCNC: NEGATIVE MG/DL
PROT UR STRIP-MCNC: NEGATIVE MG/DL

## 2023-09-01 NOTE — PROGRESS NOTES
OB FOLLOW UP  CC- Here for care of pregnancy        Sabrina Bass is a 33 y.o.  24w4d patient being seen today for her obstetrical follow up visit. Patient reports headache. That is relieved by Tylenol or rest. .     Her prenatal care is complicated by (and status) : Type Type 2, insulin-dependent DM. She states that she was not taking her BG or her insulin as she should. Reports she just started 2 days ago on her insulin and her fasting BG was 150 the next day was 130 after the insulin. She has not checked it today yet.  Patient Active Problem List   Diagnosis    History of anal fissures    Obesity in pregnancy, antepartum    History of  section    Prenatal care, antepartum    Thyromegaly    Pregnancy complicated by pre-existing type 2 diabetes in second trimester    Pregnancy complicated by pre-existing type 2 diabetes, second trimester     US done today: Yes.  Findings showed Male fetus in breech presentation with fetal heart rate of 148.  Posterior placenta and three-vessel cord noted.  Estimated fetal weight 1 pound 12 ounces which is 61st percentile.  Heart views seen today and are suboptimal but appears normal in nature..  I have personally evaluated the U/S and agree with the findings. Chio Dumas MD    ROS -   Patient Reports : Headaches  Patient Denies: Loss of Fluid, Vaginal Spotting, Vision Changes, and Contractions  Fetal Movement : normal  All other systems reviewed and are negative.       The additional following portions of the patient's history were reviewed and updated as appropriate: allergies, current medications, past family history, past medical history, past social history, past surgical history, and problem list.      I have reviewed and agree with the HPI, ROS, and historical information as entered above. Chio Dumas MD      /66   Wt 118 kg (261 lb)   LMP 2023   BMI 43.43 kg/mý       EXAM:     Prenatal Vitals  BP: 116/66  Weight:  118 kg (261 lb)   Fetal Heart Rate: 148bpm               Urine Glucose Read-only: Negative  Urine Protein Read-only: Negative       Assessment and Plan    Problem List Items Addressed This Visit          Endocrine and Metabolic    Pregnancy complicated by pre-existing type 2 diabetes, second trimester    Relevant Medications    insulin NPH (humuLIN N,novoLIN N) 100 UNIT/ML injection       Gastrointestinal Abdominal     History of anal fissures    Overview     And hemorrhoidectomy with repair on 2021            Gravid and     Obesity in pregnancy, antepartum    Overview     Hemoglobin A1c at new OB 6.2  Early Glucola 149-needs 3-hour testing.  Recommend baby aspirin weeks 12 through 36  Growth ultrasounds at 32 and 37 weeks  Recommend carbohydrate control, daily walking, water intake.    Anatomy at 20/2. 3VC, Normal AF amount, EFW 14oz. AC 85%. CL 68mm. Anatomy seen appears normal but is incomplete, will f/u 4 wks.          History of  section    Overview     X2.  Desires repeat    Repeat scheduled on 2023 at 7:30 AM         Prenatal care, antepartum - Primary    Relevant Orders    POC Urinalysis Dipstick (Completed)       Pregnancy at 24w4d  Fetal status reassuring.  anatomy scan completed today and within normal limits.  CBC, Antibody screen, and TDAP next visit. Instructions given  Discussed/encouraged TDAP vaccination after 28 weeks  Discussed bASA for PIH prevention from 12 to 36wk  Reviewed blood sugars and goals.  Discussed repercussions of uncontrolled blood sugars on pregnancy.  Activity and Exercise discussed.  Return in about 4 weeks (around 2023).    Chio Dumas MD  2023

## 2023-09-13 NOTE — PROGRESS NOTES
Chief complaint/Reason for consult: Pregnancy complicated by pre-existing type 2 diabetes     HPI: Ms. Bass is a 33-year-old female currently pregnant who comes for follow-up of type 2 diabetes during pregnancy.  She was last seen 2023 and reports since that time poor glycemic control although recently has done much better.     Currently 26w3d pregnant (male)   Prior pregnancies: 2 full term without GDM   Patient had 100g OGTT done early showin2023  Fasting glucose 102  1 hour 216mg/dl   2 hour 152mg/dl   3 hour 147mg/dl      Hemoglobin A1c 6.2% on 5/10/2023  Plan for  2023      # Pregnancy complicated by pre-existing diabetes without complications  - Diagnosed: 2023 at 12w gestation based on abnormal 3h OGTT   - Has strong family history of T2DM on paternal side   - Current regimen includes: NPH 24u in the evening    - HbA1c: 5.3% today, in office glucose 93 mg/dL today  - FSBG review shows:  Fastin-110 mg/dl   Rarely eats breakfast so does not check BG routinely, reports when she does it is under 120mg/dl    After lunch: low 100s, mostly at goal  After supper: low 100s, mostly at goal      - No issues with pregnancy at this time   - Patient denies neuropathy   - Patient denies gastroparesis   - Patient without known ASCVD   - Blood pressure today 126/68   - Reports not eating breakfast frequently due to nausea, eats lunch and supper (snacks some; has orange juice occasionally (11g CHO))   - 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     Review of Systems   Constitutional:  Positive for activity change and fatigue.   HENT:  Negative  "for trouble swallowing and voice change.    Eyes:  Negative for visual disturbance.   Respiratory:  Negative for chest tightness.    Cardiovascular:  Negative for chest pain.   Gastrointestinal:  Positive for nausea. Negative for abdominal pain.   Endocrine: Negative for cold intolerance and heat intolerance.   Musculoskeletal:  Negative for gait problem.   Skin:  Negative for rash.   Neurological:  Negative for numbness.   Psychiatric/Behavioral:  Negative for agitation and confusion.       /68   Pulse 68   Ht 165.1 cm (65\")   Wt 117 kg (258 lb)   LMP 2023   BMI 42.93 kg/m²      Physical Exam  Vitals reviewed.   Constitutional:       General: She is not in acute distress.     Appearance: She is obese.   HENT:      Head: Normocephalic.      Nose: Nose normal.   Eyes:      Conjunctiva/sclera: Conjunctivae normal.   Cardiovascular:      Rate and Rhythm: Normal rate.   Pulmonary:      Effort: Pulmonary effort is normal.   Abdominal:      Tenderness: There is no guarding.      Comments: Gravid uterus    Musculoskeletal:         General: Normal range of motion.   Skin:     General: Skin is warm and dry.   Neurological:      Mental Status: She is alert and oriented to person, place, and time.   Psychiatric:         Mood and Affect: Mood normal.         Behavior: Behavior normal.         Thought Content: Thought content normal.        Labs and images reviewed as noted in the HPI    Assessment and plan:    Diagnoses and all orders for this visit:    1. Pregnancy complicated by pre-existing type 2 diabetes in second trimester (Primary)  Assessment & Plan:  -Diabetes remains uncontrolled due to hyperglycemia relative to pregnancy targets  -Currently in second trimester of pregnancy, plans for  2023  -Discussed BG goals during pregnancy (fasting <95, 1 hr post prandial <140, 2 hr post prandial < 120). Pt to instructed to check pre- and post-meal and at bedtime, keep log, and message me if above " goal   -Counseled the importance of dietary changes and continue to get regular physical activity  -Recommend increasing NPH to 28 units at night, if daytime hyperglycemia becomes an issue recommend starting NPH 12 units in the morning    Orders:  -     POC Glycosylated Hemoglobin (Hb A1C)  -     POC Glucose, Blood  -     insulin NPH (humuLIN N,novoLIN N) 100 UNIT/ML injection; Inject 28u at night, titrate for max daily dose of 50u  Dispense: 15 mL; Refill: 2    2. Thyromegaly  Assessment & Plan:  -Recommend checking TSH, free T3 and free T4 with upcoming labs    Orders:  -     TSH; Future  -     T4, Free; Future  -     T3, Free; Future       Return in about 6 weeks (around 10/26/2023) for T2DM during GDM .     Electronically signed by: Kyle S Rosenstein, MD

## 2023-09-14 ENCOUNTER — OFFICE VISIT (OUTPATIENT)
Dept: ENDOCRINOLOGY | Facility: CLINIC | Age: 33
End: 2023-09-14
Payer: COMMERCIAL

## 2023-09-14 VITALS
WEIGHT: 258 LBS | HEIGHT: 65 IN | HEART RATE: 68 BPM | DIASTOLIC BLOOD PRESSURE: 68 MMHG | SYSTOLIC BLOOD PRESSURE: 126 MMHG | BODY MASS INDEX: 42.99 KG/M2

## 2023-09-14 DIAGNOSIS — E01.0 THYROMEGALY: ICD-10-CM

## 2023-09-14 DIAGNOSIS — O24.112 PREGNANCY COMPLICATED BY PRE-EXISTING TYPE 2 DIABETES IN SECOND TRIMESTER: Primary | ICD-10-CM

## 2023-09-14 LAB
EXPIRATION DATE: NORMAL
EXPIRATION DATE: NORMAL
GLUCOSE BLDC GLUCOMTR-MCNC: 93 MG/DL (ref 70–130)
HBA1C MFR BLD: 5.3 %
Lab: NORMAL
Lab: NORMAL

## 2023-09-14 PROCEDURE — 99214 OFFICE O/P EST MOD 30 MIN: CPT | Performed by: HOSPITALIST

## 2023-09-14 PROCEDURE — 82947 ASSAY GLUCOSE BLOOD QUANT: CPT | Performed by: HOSPITALIST

## 2023-09-14 PROCEDURE — 83036 HEMOGLOBIN GLYCOSYLATED A1C: CPT | Performed by: HOSPITALIST

## 2023-09-14 NOTE — ASSESSMENT & PLAN NOTE
-Diabetes remains uncontrolled due to hyperglycemia relative to pregnancy targets  -Currently in second trimester of pregnancy, plans for  2023  -Discussed BG goals during pregnancy (fasting <95, 1 hr post prandial <140, 2 hr post prandial < 120). Pt to instructed to check pre- and post-meal and at bedtime, keep log, and message me if above goal   -Counseled the importance of dietary changes and continue to get regular physical activity  -Recommend increasing NPH to 28 units at night, if daytime hyperglycemia becomes an issue recommend starting NPH 12 units in the morning

## 2023-09-26 ENCOUNTER — ROUTINE PRENATAL (OUTPATIENT)
Dept: OBSTETRICS AND GYNECOLOGY | Facility: CLINIC | Age: 33
End: 2023-09-26
Payer: COMMERCIAL

## 2023-09-26 VITALS — WEIGHT: 260 LBS | DIASTOLIC BLOOD PRESSURE: 60 MMHG | SYSTOLIC BLOOD PRESSURE: 102 MMHG | BODY MASS INDEX: 43.27 KG/M2

## 2023-09-26 DIAGNOSIS — O24.113 PREGNANCY COMPLICATED BY PRE-EXISTING TYPE 2 DIABETES IN THIRD TRIMESTER: ICD-10-CM

## 2023-09-26 DIAGNOSIS — O99.210 OBESITY IN PREGNANCY, ANTEPARTUM: ICD-10-CM

## 2023-09-26 DIAGNOSIS — Z87.19 HISTORY OF ANAL FISSURES: ICD-10-CM

## 2023-09-26 DIAGNOSIS — Z98.891 HISTORY OF CESAREAN SECTION: ICD-10-CM

## 2023-09-26 DIAGNOSIS — E01.0 THYROMEGALY: ICD-10-CM

## 2023-09-26 DIAGNOSIS — Z34.90 PRENATAL CARE, ANTEPARTUM: Primary | ICD-10-CM

## 2023-09-26 LAB
GLUCOSE UR STRIP-MCNC: NEGATIVE MG/DL
PROT UR STRIP-MCNC: NEGATIVE MG/DL

## 2023-09-26 NOTE — PROGRESS NOTES
OB FOLLOW UP  CC- Here for care of pregnancy        Sabrina Bass is a 33 y.o.  28w1d patient being seen today for her obstetrical follow up. Patient reports BH ctx a couple of weekends ago. HA the other day and floaters one day but they did not occur together.       MBT: O+  Rhogam:  N/A  28 week packet: reviewed with patient , counseled on fetal movement , pediatrician list reviewed, and childbirth classes reviewed  TDAP: given today    Ultrasound Today: No    Her prenatal care is complicated by (and status) :  Type Type 2, insulin-dependent DM. Her average fasting BG is 110s. Her average 2hr PP BG is mostly < 120. She has been making adjustments to her insulin, currently on 36u as of last night and her FBG was 93 this am. She is only doing night time insulin due to her fasting, as her daytime BG are averaging within normal range.  Patient Active Problem List   Diagnosis    History of anal fissures    Obesity in pregnancy, antepartum    History of  section    Prenatal care, antepartum    Thyromegaly    Pregnancy complicated by pre-existing type 2 diabetes in third trimester         ROS -   Patient Reports : Vision Changes, Headaches, and Contractions  Patient Denies: Loss of Fluid and Vaginal Spotting  Fetal Movement : normal    The additional following portions of the patient's history were reviewed and updated as appropriate: allergies, current medications, past family history, past medical history, past social history, past surgical history, and problem list.    I have reviewed and agree with the HPI, ROS, and historical information as entered above. Chio Dumas MD      /60   Wt 118 kg (260 lb)   LMP 2023   BMI 43.27 kg/m²         EXAM:     Prenatal Vitals  BP: 102/60  Weight: 118 kg (260 lb)                   Urine Glucose Read-only: Negative  Urine Protein Read-only: Negative         Assessment and Plan    Problem List Items Addressed This Visit           Endocrine and Metabolic    Thyromegaly    Overview     TSH decreased.  Recommend endocrine follow-up.         Relevant Orders    T3, Free    T4, Free    TSH    Pregnancy complicated by pre-existing type 2 diabetes in third trimester    Relevant Medications    Blood Glucose Monitoring Suppl device    glucose blood test strip    Lancets 33G misc    Insulin Pen Needle 32G X 4 MM misc    insulin NPH (humuLIN N,novoLIN N) 100 UNIT/ML injection    Other Relevant Orders    US Fetal Biophysical Profile;Without Non-Stress Testing    US Ob Follow Up Transabdominal Approach       Gastrointestinal Abdominal     History of anal fissures    Overview     And hemorrhoidectomy with repair on 2021            Gravid and     Obesity in pregnancy, antepartum    Overview     Hemoglobin A1c at new OB 6.2  Early Glucola 149-needs 3-hour testing.  Recommend baby aspirin weeks 12 through 36  Growth ultrasounds at 32 and 37 weeks  Recommend carbohydrate control, daily walking, water intake.    Anatomy at 20/2. 3VC, Normal AF amount, EFW 14oz. AC 85%. CL 68mm. Anatomy seen appears normal but is incomplete, will f/u 4 wks.          History of  section    Overview     X2.  Desires repeat    Repeat scheduled on 2023 at 7:30 AM         Prenatal care, antepartum - Primary    Relevant Orders    Antibody Screen    CBC (No Diff)    POC Urinalysis Dipstick (Completed)       Pregnancy at 28w1d  Patient following with endocrinology.  Blood sugars are still elevated especially in the fastings.  Still making insulin changes.  We discussed weight loss associated with diabetes.  We will check growth ultrasound next time.  TDAP given today and CBC and type and screen today.  Fetal movement/PTL or Labor precautions  U/S ordered at follow up  Activity and Exercise discussed.  Return in about 4 weeks (around 10/24/2023) for ultrasound.    Chio Dumas MD  2023

## 2023-09-27 LAB
BLD GP AB SCN SERPL QL: NEGATIVE
ERYTHROCYTE [DISTWIDTH] IN BLOOD BY AUTOMATED COUNT: 13.2 % (ref 12.3–15.4)
HCT VFR BLD AUTO: 37.2 % (ref 34–46.6)
HGB BLD-MCNC: 12.7 G/DL (ref 12–15.9)
MCH RBC QN AUTO: 32.6 PG (ref 26.6–33)
MCHC RBC AUTO-ENTMCNC: 34.1 G/DL (ref 31.5–35.7)
MCV RBC AUTO: 95.4 FL (ref 79–97)
PLATELET # BLD AUTO: 178 10*3/MM3 (ref 140–450)
RBC # BLD AUTO: 3.9 10*6/MM3 (ref 3.77–5.28)
T3FREE SERPL-MCNC: 3 PG/ML (ref 2–4.4)
T4 FREE SERPL-MCNC: 0.79 NG/DL (ref 0.93–1.7)
TSH SERPL DL<=0.005 MIU/L-ACNC: 0.56 UIU/ML (ref 0.27–4.2)
WBC # BLD AUTO: 10.29 10*3/MM3 (ref 3.4–10.8)

## 2023-10-24 ENCOUNTER — ROUTINE PRENATAL (OUTPATIENT)
Dept: OBSTETRICS AND GYNECOLOGY | Facility: CLINIC | Age: 33
End: 2023-10-24
Payer: COMMERCIAL

## 2023-10-24 VITALS — DIASTOLIC BLOOD PRESSURE: 62 MMHG | WEIGHT: 260 LBS | SYSTOLIC BLOOD PRESSURE: 110 MMHG | BODY MASS INDEX: 43.27 KG/M2

## 2023-10-24 DIAGNOSIS — O99.210 OBESITY IN PREGNANCY, ANTEPARTUM: ICD-10-CM

## 2023-10-24 DIAGNOSIS — Z87.19 HISTORY OF ANAL FISSURES: ICD-10-CM

## 2023-10-24 DIAGNOSIS — Z34.90 PRENATAL CARE, ANTEPARTUM: ICD-10-CM

## 2023-10-24 DIAGNOSIS — Z98.891 HISTORY OF CESAREAN SECTION: ICD-10-CM

## 2023-10-24 DIAGNOSIS — O24.113 PREGNANCY COMPLICATED BY PRE-EXISTING TYPE 2 DIABETES IN THIRD TRIMESTER: Primary | ICD-10-CM

## 2023-10-24 LAB
GLUCOSE UR STRIP-MCNC: NEGATIVE MG/DL
PROT UR STRIP-MCNC: NEGATIVE MG/DL

## 2023-10-24 NOTE — PROGRESS NOTES
OB FOLLOW UP  CC- Here for care of pregnancy        Sabrina Bass is a 33 y.o.  32w1d patient being seen today for her obstetrical follow up visit. Patient reports irregular contractions 2-3 per day.     Her prenatal care is complicated by (and status) :  Type 2 DM average fasting BG has been elevated at 110s-130/140s. She has had some 80s in there as well. She states she is unable to get an appointment with endocrine. They contacted her while she was out of town and has not heard back. Average BG 2 PP <120 or <140 1 hr after.  Patient Active Problem List   Diagnosis    History of anal fissures    Obesity in pregnancy, antepartum    History of  section    Prenatal care, antepartum    Thyromegaly    Pregnancy complicated by pre-existing type 2 diabetes in third trimester       Flu Status: Declines  TDAP status: received at last visit  Rhogam status: was not indicated  28 week labs: Reviewed and normal already diabetic   US done today: Yes.  Findings showed Male fetus in cephalic presentation fetal heart rate of 133.  Posterior placenta and normal fluid volume noted.  A BPP of 6 out of 8.  Estimated fetal weight 4 pounds 15 ounces which is 69th percentile.  I have personally evaluated the U/S and agree with the findings. Chio Dumas MD    Non Stress Test: Yes minutes 20  non-stress test: NST: Reactive  indication:  BPP 6/8 and Type 2 DM  category: Category I      ROS -   Patient Reports : Contractions  Patient Denies: Loss of Fluid, Vaginal Spotting, Vision Changes, and Headaches  Fetal Movement : normal  All other systems reviewed and are negative.       The additional following portions of the patient's history were reviewed and updated as appropriate: allergies, current medications, past family history, past medical history, past social history, past surgical history, and problem list.    I have reviewed and agree with the HPI, ROS, and historical information as entered above.  Chio Dumas MD      /62   Wt 118 kg (260 lb)   LMP 2023   BMI 43.27 kg/m²         EXAM:     Prenatal Vitals  BP: 110/62  Weight: 118 kg (260 lb)   Fetal Heart Rate: 133bpm               Urine Glucose Read-only: Negative  Urine Protein Read-only: Negative           Assessment and Plan    Problem List Items Addressed This Visit          Endocrine and Metabolic    Pregnancy complicated by pre-existing type 2 diabetes in third trimester - Primary    Relevant Medications    Blood Glucose Monitoring Suppl device    glucose blood test strip    Lancets 33G misc    Insulin Pen Needle 32G X 4 MM misc    insulin NPH (humuLIN N,novoLIN N) 100 UNIT/ML injection       Gastrointestinal Abdominal     History of anal fissures    Overview     And hemorrhoidectomy with repair on 2021            Gravid and     Obesity in pregnancy, antepartum    Overview     Hemoglobin A1c at new OB 6.2  Early Glucola 149-needs 3-hour testing.  Recommend baby aspirin weeks 12 through 36  Growth ultrasounds at 32 and 37 weeks  Recommend carbohydrate control, daily walking, water intake.    Anatomy at 20/2. 3VC, Normal AF amount, EFW 14oz. AC 85%. CL 68mm. Anatomy seen appears normal but is incomplete, will f/u 4 wks.          History of  section    Overview     X2.  Desires repeat    Repeat scheduled on 2023 at 7:30 AM         Prenatal care, antepartum    Relevant Orders    POC Urinalysis Dipstick (Completed)       Pregnancy at 32w1d  Fetal status reassuring.  BPP 8 out of 10.  Good growth.  28 week labs reviewed.    Activity and Exercise discussed.  Fetal movement/PTL or Labor precautions  Reviewed FSBS goals: fasting <90, 2hr PP < 120 patient to follow-up with endocrine this Thursday.  Poor control of fastings.  Return in about 1 week (around 10/31/2023) for nst.    Chio Dumas MD  10/24/2023

## 2023-10-25 NOTE — PROGRESS NOTES
Chief complaint/Reason for consult: Pregnancy complicated by pre-existing type 2 diabetes      HPI: Ms. Bass is a 33-year-old female currently pregnant who comes for follow-up of type 2 diabetes during pregnancy.  She was last seen 2023 and reports since that time no significant health changes. Having a lot of discomfort from pregnancy at this time.      Currently 32w3d pregnant (male)   Prior pregnancies: 2 full term without GDM   Patient had 100g OGTT done early showin2023  Fasting glucose 102  1 hour 216mg/dl   2 hour 152mg/dl   3 hour 147mg/dl      Hemoglobin A1c 6.2% on 5/10/2023  Plan for  2023      # Pregnancy complicated by pre-existing diabetes without complications  - Diagnosed: 2023 at 12w gestation based on abnormal 3h OGTT   - Has strong family history of T2DM on paternal side   - Current regimen includes: NPH 39u in the evening    - FSBG review shows:  Fastin-140 mg/dl   Rarely eats breakfast so does not check BG routinely, reports when she does it is under 120mg/dl    After meal glucoses mostly at goal, has a rare above goal glucose with poor food choice      - No issues with pregnancy at this time   - Patient denies neuropathy   - Patient denies gastroparesis   - Patient without known ASCVD   - Blood pressure today 102/62   - Reports not eating breakfast frequently due to nausea, eats lunch and supper  - 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     Review of Systems   Constitutional:  Positive for fatigue. Negative for activity change.   HENT:  Negative for trouble swallowing and voice change.    Eyes:  Negative for  "visual disturbance.   Respiratory:  Negative for shortness of breath.    Cardiovascular:  Negative for chest pain.   Gastrointestinal:  Positive for abdominal distention.   Endocrine: Negative for cold intolerance and heat intolerance.   Musculoskeletal:  Positive for back pain and gait problem.   Skin:  Negative for rash.   Neurological:  Negative for numbness.   Psychiatric/Behavioral:  Negative for agitation and confusion.         /62   Pulse 95   Ht 165.1 cm (65\")   Wt 118 kg (260 lb)   LMP 03/05/2023   BMI 43.27 kg/m²      Physical Exam  Vitals reviewed.   Constitutional:       General: She is not in acute distress.  HENT:      Head: Normocephalic.      Nose: Nose normal.   Eyes:      Conjunctiva/sclera: Conjunctivae normal.   Cardiovascular:      Rate and Rhythm: Normal rate.   Pulmonary:      Effort: Pulmonary effort is normal.   Abdominal:      Tenderness: There is no guarding.      Comments: Gravid uterus   Musculoskeletal:         General: Normal range of motion.   Skin:     General: Skin is warm and dry.   Neurological:      General: No focal deficit present.      Mental Status: She is alert and oriented to person, place, and time.   Psychiatric:         Mood and Affect: Mood normal.        Labs and images reviewed as noted in the HPI    Assessment and plan:    Diagnoses and all orders for this visit:    1. Pregnancy complicated by pre-existing type 2 diabetes in third trimester (Primary)  Assessment & Plan:  -Diabetes is currently uncontrolled due to fasting hyperglycemia per gestational criteria  -Recommend increasing NPH to 44 units once daily at night  -Given her daytime glucoses are for the most part controlled as long as her diet is reasonable recommend continuing NPH at nighttime only, if daytime glucoses become uncontrolled recommend starting 15 units with breakfast of NPH  -Continue checking FSBG 4 times daily  -Discussed BG goals during pregnancy (fasting <95, 1 hr post prandial <140, " 2 hr post prandial < 120). Pt to instructed to check pre- and post-meal and at bedtime, keep log, and message me log weekly   -Discussed the importance of diet and carb counting to help control glucoses along with regular physical activity  -Recommend patient stop insulin after delivery and continue to check FSBG at least once daily  -We will follow-up about 3 months after delivery for T2DM    Orders:  -     insulin NPH (humuLIN N,novoLIN N) 100 UNIT/ML injection; Inject 44u at night, titrate for max daily dose of 100u  Dispense: 30 mL; Refill: 2    2. Thyromegaly  Assessment & Plan:  -TFTs done 9/25/2023 without concerning issues, her free T4 is borderline low which can be lab artifact due to protein changes during pregnancy, offered patient to check free T4 by equilibrium dialysis but she is not concerned at this time and does not have symptoms of hypothyroidism; at this time the fetal thyroid gland is formed and functioning  -We will repeat thyroid function tests at follow-up 3/2024           Return in about 4 months (around 3/1/2024) for T2DM prior GDM.     Electronically signed by: Kyle S Rosenstein, MD

## 2023-10-26 ENCOUNTER — OFFICE VISIT (OUTPATIENT)
Dept: ENDOCRINOLOGY | Facility: CLINIC | Age: 33
End: 2023-10-26
Payer: COMMERCIAL

## 2023-10-26 VITALS
HEIGHT: 65 IN | BODY MASS INDEX: 43.32 KG/M2 | WEIGHT: 260 LBS | SYSTOLIC BLOOD PRESSURE: 102 MMHG | HEART RATE: 95 BPM | DIASTOLIC BLOOD PRESSURE: 62 MMHG

## 2023-10-26 DIAGNOSIS — O24.113 PREGNANCY COMPLICATED BY PRE-EXISTING TYPE 2 DIABETES IN THIRD TRIMESTER: Primary | ICD-10-CM

## 2023-10-26 DIAGNOSIS — E01.0 THYROMEGALY: ICD-10-CM

## 2023-10-26 NOTE — ASSESSMENT & PLAN NOTE
-TFTs done 9/25/2023 without concerning issues, her free T4 is borderline low which can be lab artifact due to protein changes during pregnancy, offered patient to check free T4 by equilibrium dialysis but she is not concerned at this time and does not have symptoms of hypothyroidism; at this time the fetal thyroid gland is formed and functioning  -We will repeat thyroid function tests at follow-up 3/2024

## 2023-10-26 NOTE — ASSESSMENT & PLAN NOTE
-Diabetes is currently uncontrolled due to fasting hyperglycemia per gestational criteria  -Recommend increasing NPH to 44 units once daily at night  -Given her daytime glucoses are for the most part controlled as long as her diet is reasonable recommend continuing NPH at nighttime only, if daytime glucoses become uncontrolled recommend starting 15 units with breakfast of NPH  -Continue checking FSBG 4 times daily  -Discussed BG goals during pregnancy (fasting <95, 1 hr post prandial <140, 2 hr post prandial < 120). Pt to instructed to check pre- and post-meal and at bedtime, keep log, and message me log weekly   -Discussed the importance of diet and carb counting to help control glucoses along with regular physical activity  -Recommend patient stop insulin after delivery and continue to check FSBG at least once daily  -We will follow-up about 3 months after delivery for T2DM

## 2023-10-30 ENCOUNTER — ROUTINE PRENATAL (OUTPATIENT)
Dept: OBSTETRICS AND GYNECOLOGY | Facility: CLINIC | Age: 33
End: 2023-10-30
Payer: COMMERCIAL

## 2023-10-30 VITALS — WEIGHT: 263.2 LBS | SYSTOLIC BLOOD PRESSURE: 112 MMHG | DIASTOLIC BLOOD PRESSURE: 64 MMHG | BODY MASS INDEX: 43.8 KG/M2

## 2023-10-30 DIAGNOSIS — Z98.891 HISTORY OF CESAREAN SECTION: ICD-10-CM

## 2023-10-30 DIAGNOSIS — O24.113 PREGNANCY COMPLICATED BY PRE-EXISTING TYPE 2 DIABETES IN THIRD TRIMESTER: ICD-10-CM

## 2023-10-30 DIAGNOSIS — O99.210 OBESITY IN PREGNANCY, ANTEPARTUM: ICD-10-CM

## 2023-10-30 DIAGNOSIS — Z34.90 PRENATAL CARE, ANTEPARTUM: Primary | ICD-10-CM

## 2023-10-30 LAB
GLUCOSE UR STRIP-MCNC: NEGATIVE MG/DL
PROT UR STRIP-MCNC: NEGATIVE MG/DL

## 2023-10-30 NOTE — PROGRESS NOTES
OB FOLLOW UP  CC- Here for care of pregnancy        Sabrina Bass is a 33 y.o.  33w0d patient being seen today for her obstetrical follow up visit. Patient reports occasional nausea and headaches(relieved after 1 day and Tylenol).     Her prenatal care is complicated by (and status) :  GDM requiring insulin. Her average fasting BG is 110s. Her average 2hr PP BG is <120s recent increase in long-acting insulin to 44 units at night.  Patient Active Problem List   Diagnosis    History of anal fissures    Obesity in pregnancy, antepartum    History of  section    Prenatal care, antepartum    Thyromegaly    Pregnancy complicated by pre-existing type 2 diabetes in third trimester       Flu Status: Already given in current flu season  TDAP status: received at last visit  Rhogam status: was not indicated  28 week labs: Reviewed and normal  Ultrasound Today: No  Non Stress Test: Yes minutes 45  non-stress test: NST: Reactive  indication:  Type 2 DM insulin       ROS -   Patient Denies: Loss of Fluid, Vaginal Spotting, Vision Changes, Vomiting , and Epigastric pain  Fetal Movement : normal  All other systems reviewed and are negative.       The additional following portions of the patient's history were reviewed and updated as appropriate: allergies, current medications, past family history, past medical history, past social history, past surgical history, and problem list.    I have reviewed and agree with the HPI, ROS, and historical information as entered above. Chio Dumas MD      /64   Wt 119 kg (263 lb 3.2 oz)   LMP 2023   BMI 43.80 kg/m²         EXAM:     Prenatal Vitals  BP: 112/64  Weight: 119 kg (263 lb 3.2 oz)   Fetal Heart Rate: 150               Urine Glucose Read-only: Negative  Urine Protein Read-only: Negative           Assessment and Plan    Problem List Items Addressed This Visit          Endocrine and Metabolic    Pregnancy complicated by pre-existing type  2 diabetes in third trimester    Overview     At 32 weeks - 4 pounds 15 ounces which is 69th percentile.  Of note AC was 85th percentile.  Recommend repeat at 37 weeks.  Recommend twice weekly monitoring         Relevant Medications    Blood Glucose Monitoring Suppl device    glucose blood test strip    Lancets 33G misc    Insulin Pen Needle 32G X 4 MM misc    insulin NPH (humuLIN N,novoLIN N) 100 UNIT/ML injection       Gravid and     Obesity in pregnancy, antepartum    Overview     Hemoglobin A1c at new OB 6.2  Early Glucola 149-needs 3-hour testing.  Recommend baby aspirin weeks 12 through 36  Growth ultrasounds at 32 and 37 weeks  Recommend carbohydrate control, daily walking, water intake.    Anatomy at 20/2. 3VC, Normal AF amount, EFW 14oz. AC 85%. CL 68mm. Anatomy seen appears normal but is incomplete, will f/u 4 wks.          History of  section    Overview     X2.  Desires repeat    Repeat scheduled on 2023 at 7:30 AM         Prenatal care, antepartum - Primary    Relevant Orders    POC Urinalysis Dipstick (Completed)       Pregnancy at 33w0d  Fetal status reassuring.  28 week labs reviewed.    Activity and Exercise discussed.  Fetal movement/PTL or Labor precautions  Return in about 3 days (around 2023) for nst.    Chio Dumas MD  10/30/2023

## 2023-11-02 ENCOUNTER — ROUTINE PRENATAL (OUTPATIENT)
Dept: OBSTETRICS AND GYNECOLOGY | Facility: CLINIC | Age: 33
End: 2023-11-02
Payer: COMMERCIAL

## 2023-11-02 VITALS — SYSTOLIC BLOOD PRESSURE: 110 MMHG | DIASTOLIC BLOOD PRESSURE: 70 MMHG | BODY MASS INDEX: 43.47 KG/M2 | WEIGHT: 261.2 LBS

## 2023-11-02 DIAGNOSIS — Z34.90 PRENATAL CARE, ANTEPARTUM: ICD-10-CM

## 2023-11-02 DIAGNOSIS — Z34.93 THIRD TRIMESTER PREGNANCY: Primary | ICD-10-CM

## 2023-11-02 DIAGNOSIS — Z98.891 HISTORY OF CESAREAN SECTION: ICD-10-CM

## 2023-11-02 DIAGNOSIS — O99.210 OBESITY IN PREGNANCY, ANTEPARTUM: ICD-10-CM

## 2023-11-02 DIAGNOSIS — O24.113 PREGNANCY COMPLICATED BY PRE-EXISTING TYPE 2 DIABETES IN THIRD TRIMESTER: ICD-10-CM

## 2023-11-02 LAB
EXPIRATION DATE: 0
GLUCOSE UR STRIP-MCNC: NEGATIVE MG/DL
Lab: 0
PROT UR STRIP-MCNC: NEGATIVE MG/DL

## 2023-11-02 NOTE — PROGRESS NOTES
OB FOLLOW UP  CC- Here for care of pregnancy        Sabrina Bass is a 33 y.o.  33w3d patient being seen today for her obstetrical follow up visit. Patient reports no complaints. Blood sugars averaging in the 110's fasting and <120's PP.    Her prenatal care is complicated by (and status) :   Patient Active Problem List   Diagnosis    History of anal fissures    Obesity in pregnancy, antepartum    History of  section    Prenatal care, antepartum    Thyromegaly    Pregnancy complicated by pre-existing type 2 diabetes in third trimester       Flu Status: Already given in current flu season  Ultrasound Today: No  Non Stress Test: Yes minutes >20  non-stress test: NST: Reactive  indication:  pre-existing type 2 DM    ROS -   Patient Reports : No Problems  Patient Denies: Loss of Fluid, Vaginal Spotting, Vision Changes, Headaches, Nausea , Vomiting , Contractions, and Epigastric pain  Fetal Movement : normal  All other systems reviewed and are negative.       The additional following portions of the patient's history were reviewed and updated as appropriate: allergies and current medications.    I have reviewed and agree with the HPI, ROS, and historical information as entered above. Luanne Alston, APRN      /70   Wt 118 kg (261 lb 3.2 oz)   LMP 2023   BMI 43.47 kg/m²       EXAM:     Prenatal Vitals  BP: 110/70  Weight: 118 kg (261 lb 3.2 oz)   Fetal Heart Rate: 150               Urine Glucose Read-only: Negative  Urine Protein Read-only: Negative           Assessment and Plan    Problem List Items Addressed This Visit       Obesity in pregnancy, antepartum    Overview     Hemoglobin A1c at new OB 6.2  Early Glucola 149-needs 3-hour testing.  Recommend baby aspirin weeks 12 through 36  Growth ultrasounds at 32 and 37 weeks  Recommend carbohydrate control, daily walking, water intake.    Anatomy at 20/2. 3VC, Normal AF amount, EFW 14oz. AC 85%. CL 68mm. Anatomy seen  appears normal but is incomplete, will f/u 4 wks.          History of  section    Overview     X2.  Desires repeat    Repeat scheduled on 2023 at 7:30 AM         Prenatal care, antepartum    Pregnancy complicated by pre-existing type 2 diabetes in third trimester    Overview     At 32 weeks - 4 pounds 15 ounces which is 69th percentile.  Of note AC was 85th percentile.  Recommend repeat at 37 weeks.  Recommend twice weekly monitoring         Relevant Medications    Blood Glucose Monitoring Suppl device    glucose blood test strip    Lancets 33G misc    Insulin Pen Needle 32G X 4 MM misc    insulin NPH (humuLIN N,novoLIN N) 100 UNIT/ML injection     Other Visit Diagnoses       Third trimester pregnancy    -  Primary    Relevant Orders    POC Glucose, Urine, Qualitative, Dipstick (Completed)    POC Protein, Urine, Qualitative, Dipstick (Completed)            Pregnancy at 33w3d  Fetal status reassuring.   Activity and Exercise discussed.  Fetal movement/PTL or Labor precautions  Patient is on Prenatal vitamins  Reviewed FSBS goals: fasting <90, 2hr PP < 120  Return in about 5 days (around 2023) for NST.    Luanne Alston, APRN  2023

## 2023-11-06 ENCOUNTER — PREP FOR SURGERY (OUTPATIENT)
Dept: OTHER | Facility: HOSPITAL | Age: 33
End: 2023-11-06
Payer: COMMERCIAL

## 2023-11-06 DIAGNOSIS — Z98.891 HISTORY OF CESAREAN SECTION: Primary | ICD-10-CM

## 2023-11-06 RX ORDER — OXYTOCIN/0.9 % SODIUM CHLORIDE 30/500 ML
85 PLASTIC BAG, INJECTION (ML) INTRAVENOUS ONCE
OUTPATIENT
Start: 2023-11-06 | End: 2023-11-06

## 2023-11-06 RX ORDER — OXYTOCIN/0.9 % SODIUM CHLORIDE 30/500 ML
650 PLASTIC BAG, INJECTION (ML) INTRAVENOUS ONCE
OUTPATIENT
Start: 2023-11-06 | End: 2023-11-06

## 2023-11-06 RX ORDER — SODIUM CHLORIDE 9 MG/ML
40 INJECTION, SOLUTION INTRAVENOUS AS NEEDED
OUTPATIENT
Start: 2023-11-06

## 2023-11-06 RX ORDER — SODIUM CHLORIDE 0.9 % (FLUSH) 0.9 %
10 SYRINGE (ML) INJECTION EVERY 12 HOURS SCHEDULED
OUTPATIENT
Start: 2023-11-06

## 2023-11-06 RX ORDER — KETOROLAC TROMETHAMINE 30 MG/ML
30 INJECTION, SOLUTION INTRAMUSCULAR; INTRAVENOUS ONCE
OUTPATIENT
Start: 2023-11-06 | End: 2023-11-06

## 2023-11-06 RX ORDER — ACETAMINOPHEN 500 MG
1000 TABLET ORAL ONCE
OUTPATIENT
Start: 2023-11-06 | End: 2023-11-06

## 2023-11-06 RX ORDER — METHYLERGONOVINE MALEATE 0.2 MG/ML
200 INJECTION INTRAVENOUS ONCE AS NEEDED
OUTPATIENT
Start: 2023-11-06

## 2023-11-06 RX ORDER — SODIUM CHLORIDE, SODIUM LACTATE, POTASSIUM CHLORIDE, CALCIUM CHLORIDE 600; 310; 30; 20 MG/100ML; MG/100ML; MG/100ML; MG/100ML
125 INJECTION, SOLUTION INTRAVENOUS CONTINUOUS
OUTPATIENT
Start: 2023-11-06

## 2023-11-06 RX ORDER — LIDOCAINE HYDROCHLORIDE 10 MG/ML
0.5 INJECTION, SOLUTION EPIDURAL; INFILTRATION; INTRACAUDAL; PERINEURAL ONCE AS NEEDED
OUTPATIENT
Start: 2023-11-06

## 2023-11-06 RX ORDER — SODIUM CHLORIDE 0.9 % (FLUSH) 0.9 %
10 SYRINGE (ML) INJECTION AS NEEDED
OUTPATIENT
Start: 2023-11-06

## 2023-11-06 RX ORDER — MISOPROSTOL 200 UG/1
800 TABLET ORAL AS NEEDED
OUTPATIENT
Start: 2023-11-06

## 2023-11-06 RX ORDER — CARBOPROST TROMETHAMINE 250 UG/ML
250 INJECTION, SOLUTION INTRAMUSCULAR AS NEEDED
OUTPATIENT
Start: 2023-11-06

## 2023-11-06 RX ORDER — CITRIC ACID/SODIUM CITRATE 334-500MG
30 SOLUTION, ORAL ORAL ONCE
OUTPATIENT
Start: 2023-11-06 | End: 2023-11-06

## 2023-11-07 ENCOUNTER — ROUTINE PRENATAL (OUTPATIENT)
Dept: OBSTETRICS AND GYNECOLOGY | Facility: CLINIC | Age: 33
End: 2023-11-07
Payer: COMMERCIAL

## 2023-11-07 VITALS — DIASTOLIC BLOOD PRESSURE: 62 MMHG | BODY MASS INDEX: 43.6 KG/M2 | WEIGHT: 262 LBS | SYSTOLIC BLOOD PRESSURE: 110 MMHG

## 2023-11-07 DIAGNOSIS — Z98.891 HISTORY OF CESAREAN SECTION: ICD-10-CM

## 2023-11-07 DIAGNOSIS — O24.113 PREGNANCY COMPLICATED BY PRE-EXISTING TYPE 2 DIABETES IN THIRD TRIMESTER: Primary | ICD-10-CM

## 2023-11-07 DIAGNOSIS — Z34.90 PRENATAL CARE, ANTEPARTUM: ICD-10-CM

## 2023-11-07 DIAGNOSIS — O99.210 OBESITY IN PREGNANCY, ANTEPARTUM: ICD-10-CM

## 2023-11-07 LAB
GLUCOSE UR STRIP-MCNC: NEGATIVE MG/DL
PROT UR STRIP-MCNC: NEGATIVE MG/DL

## 2023-11-07 NOTE — PROGRESS NOTES
OB FOLLOW UP  CC- Here for care of pregnancy        Sabrina Bass is a 33 y.o.  34w1d patient being seen today for her obstetrical follow up visit. Patient reports occasional nausea and BH contractions. Pt also reports headaches relieved with Tylenol and ice packs.     Her prenatal care is complicated by (and status) : Type Type 2, insulin-dependent DM. Her average fasting BG is , today it was 144. Her average 2hr PP BG is <120.  Patient Active Problem List   Diagnosis    History of anal fissures    Obesity in pregnancy, antepartum    History of  section    Prenatal care, antepartum    Thyromegaly    Pregnancy complicated by pre-existing type 2 diabetes in third trimester       Flu Status: Already given in current flu season  Ultrasound Today: No  Non Stress Test: Yes minutes 20 mins    non-stress test: NST: Reactive  indication:  Type 2 DM insulin    ROS -   Patient Denies: Loss of Fluid, Vaginal Spotting, Vision Changes, Vomiting , and Epigastric pain  Fetal Movement : normal  All other systems reviewed and are negative.       The additional following portions of the patient's history were reviewed and updated as appropriate: allergies, current medications, past family history, past medical history, past social history, past surgical history, and problem list.    I have reviewed and agree with the HPI, ROS, and historical information as entered above. Chio Dumas MD      /62   Wt 119 kg (262 lb)   LMP 2023   BMI 43.60 kg/m²       EXAM:     Prenatal Vitals  BP: 110/62  Weight: 119 kg (262 lb)                   Urine Glucose Read-only: Negative  Urine Protein Read-only: Negative           Assessment and Plan    Problem List Items Addressed This Visit          Endocrine and Metabolic    Pregnancy complicated by pre-existing type 2 diabetes in third trimester - Primary    Overview     At 32 weeks - 4 pounds 15 ounces which is 69th percentile.  Of note AC was  85th percentile.    Recommend repeat at 37 weeks.  -Order in  Recommend twice weekly monitoring         Relevant Medications    Blood Glucose Monitoring Suppl device    glucose blood test strip    Lancets 33G misc    Insulin Pen Needle 32G X 4 MM misc    insulin NPH (humuLIN N,novoLIN N) 100 UNIT/ML injection    Other Relevant Orders    US Ob Follow Up Transabdominal Approach    US Fetal Biophysical Profile;Without Non-Stress Testing       Gravid and     Obesity in pregnancy, antepartum    Overview     Hemoglobin A1c at new OB 6.2  Early Glucola 149-needs 3-hour testing.  Recommend baby aspirin weeks 12 through 36  Growth ultrasounds at 32 and 37 weeks  Recommend carbohydrate control, daily walking, water intake.    Anatomy at 20/2. 3VC, Normal AF amount, EFW 14oz. AC 85%. CL 68mm. Anatomy seen appears normal but is incomplete, will f/u 4 wks.          History of  section    Overview     X2.  Desires repeat    Repeat scheduled on 2023 at 7:30 AM-orders in         Relevant Orders    US Ob Follow Up Transabdominal Approach    US Fetal Biophysical Profile;Without Non-Stress Testing    Prenatal care, antepartum    Relevant Orders    POC Urinalysis Dipstick (Completed)       Pregnancy at 34w1d  Fetal status reassuring.   Patient with elevated fasting this morning of 144.  She is going to reach out to endocrine for changes as needed.We will get a growth ultrasound at 37 weeks.  Continue twice-weekly testing.  Activity and Exercise discussed.  Fetal movement/PTL or Labor precautions  Discussed bASA for PIH prevention from 12 to 36wk  Return in about 3 days (around 11/10/2023) for nst. Please also schedule please also schedule ultrasound for growth in 3 weeks.    Chio Dumas MD  2023

## 2023-11-09 ENCOUNTER — ROUTINE PRENATAL (OUTPATIENT)
Dept: OBSTETRICS AND GYNECOLOGY | Facility: CLINIC | Age: 33
End: 2023-11-09
Payer: COMMERCIAL

## 2023-11-09 VITALS — SYSTOLIC BLOOD PRESSURE: 108 MMHG | WEIGHT: 261 LBS | BODY MASS INDEX: 43.43 KG/M2 | DIASTOLIC BLOOD PRESSURE: 68 MMHG

## 2023-11-09 DIAGNOSIS — E01.0 THYROMEGALY: ICD-10-CM

## 2023-11-09 DIAGNOSIS — Z34.90 PRENATAL CARE, ANTEPARTUM: Primary | ICD-10-CM

## 2023-11-09 DIAGNOSIS — O24.113 PREGNANCY COMPLICATED BY PRE-EXISTING TYPE 2 DIABETES IN THIRD TRIMESTER: ICD-10-CM

## 2023-11-09 DIAGNOSIS — Z98.891 HISTORY OF CESAREAN SECTION: ICD-10-CM

## 2023-11-09 LAB
GLUCOSE UR STRIP-MCNC: NEGATIVE MG/DL
PROT UR STRIP-MCNC: NEGATIVE MG/DL

## 2023-11-09 NOTE — PROGRESS NOTES
OB FOLLOW UP  CC- Here for care of pregnancy        Sabrina Bass is a 33 y.o.  34w3d patient being seen today for her obstetrical follow up visit. Patient reports heartburn. She is not taking OTC medication for treatment currently..     Her prenatal care is complicated by (and status) : Previous  section x2, Type Type 2, insulin-dependent DM. Her average fasting BG is <110. Her average 2hr PP BG is <120., and morbid obesity  Patient Active Problem List   Diagnosis    History of anal fissures    Obesity in pregnancy, antepartum    History of  section    Prenatal care, antepartum    Thyromegaly    Pregnancy complicated by pre-existing type 2 diabetes in third trimester       Flu Status: Declines  Ultrasound Today: No  Non Stress Test: Yes , 20+ minutes, nonreactive, indication: T2DM on insulin    ROS -   Patient Reports :  heartburn  Patient Denies: Loss of Fluid, Vaginal Spotting, Vision Changes, Headaches, Nausea , Vomiting , Contractions, and Epigastric pain  Fetal Movement : normal  All other systems reviewed and are negative.       The additional following portions of the patient's history were reviewed and updated as appropriate: allergies, current medications, past medical history, past surgical history, and problem list.    I have reviewed and agree with the HPI, ROS, and historical information as entered above. Gay Campoverde, APRN      /68   Wt 118 kg (261 lb)   LMP 2023   BMI 43.43 kg/m²       EXAM:     Prenatal Vitals  BP: 108/68  Weight: 118 kg (261 lb)   Fetal Heart Rate: NST               Urine Glucose Read-only: Negative  Urine Protein Read-only: Negative           Assessment and Plan    Problem List Items Addressed This Visit          Endocrine and Metabolic    Thyromegaly    Overview     TSH decreased.  Recommend endocrine follow-up.         Pregnancy complicated by pre-existing type 2 diabetes in third trimester    Overview     At 32 weeks - 4 pounds  15 ounces which is 69th percentile.  Of note AC was 85th percentile.    Recommend repeat at 37 weeks.  -Order in  Recommend twice weekly monitoring         Relevant Medications    Blood Glucose Monitoring Suppl device    glucose blood test strip    Lancets 33G misc    Insulin Pen Needle 32G X 4 MM misc    insulin NPH (humuLIN N,novoLIN N) 100 UNIT/ML injection    Other Relevant Orders    US Fetal Biophysical Profile;With Non-Stress Testing    US Fetal Biophysical Profile;With Non-Stress Testing       Gravid and     History of  section    Overview     X2.  Desires repeat    Repeat scheduled on 2023 at 7:30 AM-orders in         Prenatal care, antepartum - Primary    Relevant Orders    POC Urinalysis Dipstick (Completed)       Pregnancy at 34w3d  Fetal status reassuring.  NST nonreactive; BPP 8/8  Activity and Exercise discussed.  Fetal movement/PTL or Labor precautions  Patient is on Prenatal vitamins  Reviewed Pre-eclampsia signs/symptoms  Return in about 4 days (around 2023) for NST.  Reminded pt to stay in touch with endo regarding glucose and insulin doses.    Gay Campoverde, APRN  2023

## 2023-11-13 ENCOUNTER — DOCUMENTATION (OUTPATIENT)
Dept: OBSTETRICS AND GYNECOLOGY | Facility: HOSPITAL | Age: 33
End: 2023-11-13
Payer: COMMERCIAL

## 2023-11-13 ENCOUNTER — ROUTINE PRENATAL (OUTPATIENT)
Dept: OBSTETRICS AND GYNECOLOGY | Facility: CLINIC | Age: 33
End: 2023-11-13
Payer: COMMERCIAL

## 2023-11-13 VITALS — SYSTOLIC BLOOD PRESSURE: 108 MMHG | DIASTOLIC BLOOD PRESSURE: 62 MMHG | BODY MASS INDEX: 43.27 KG/M2 | WEIGHT: 260 LBS

## 2023-11-13 DIAGNOSIS — Z98.891 HISTORY OF CESAREAN SECTION: ICD-10-CM

## 2023-11-13 DIAGNOSIS — O24.113 PREGNANCY COMPLICATED BY PRE-EXISTING TYPE 2 DIABETES IN THIRD TRIMESTER: ICD-10-CM

## 2023-11-13 DIAGNOSIS — Z34.90 PRENATAL CARE, ANTEPARTUM: Primary | ICD-10-CM

## 2023-11-13 DIAGNOSIS — O99.210 OBESITY IN PREGNANCY, ANTEPARTUM: ICD-10-CM

## 2023-11-13 LAB
GLUCOSE UR STRIP-MCNC: NEGATIVE MG/DL
PROT UR STRIP-MCNC: NEGATIVE MG/DL

## 2023-11-13 PROCEDURE — 0502F SUBSEQUENT PRENATAL CARE: CPT | Performed by: OBSTETRICS & GYNECOLOGY

## 2023-11-13 RX ORDER — CALCIUM CARBONATE 500 MG/1
1 TABLET, CHEWABLE ORAL AS NEEDED
COMMUNITY

## 2023-11-13 RX ORDER — ACETAMINOPHEN 500 MG
500 TABLET ORAL EVERY 6 HOURS PRN
COMMUNITY

## 2023-11-13 NOTE — PROGRESS NOTES
"      OB FOLLOW UP  CC- Here for care of pregnancy        Sabrina Bass is a 33 y.o.  35w0d patient being seen today for her obstetrical follow up visit. Patient reports BH contractions, swelling in the upper and lower extremities (It is Pitting), headache (That is relieved by Tylenol or rest), low back pain (She denies dysuria), heartburn (She is taking OTC medication for treatment currently), nausea, and cervical pressure/pain. Patient states that she has been taking Tylenol, using ice packs, and peppermint oil for HA. Patient states that she has several BH contractions last Thursday followed by an increase in FM. Patient states that since this episode of contractions and increased FM, her FM has been different. Patient states that she is still getting more than 10/10 FM, but FM has been decreased and not as strong as baby's \"normal\" FM.     Her prenatal care is complicated by (and status) : GDM requiring insulin. Her average fasting BG is in the 100s.  This morning it is 111.. Her average 2hr PP BG is <120.  She does have a few that are about greater than 120, and she is not testing after each meal.  Patient Active Problem List   Diagnosis    History of anal fissures    Obesity in pregnancy, antepartum    History of  section    Prenatal care, antepartum    Thyromegaly    Pregnancy complicated by pre-existing type 2 diabetes in third trimester       Flu Status: Declines  Ultrasound Today: Yes. FHR: 148bpm. Cephalic. Fundal placenta. 3 vessel cord. JIMBO: 9.2CM. BPP: 8/10. Chio Dumas MD    Non Stress Test: Yes, 20 minutes   non-stress test: NST: Non-Reactive  indication: Diabetes Mellitus Gestational      ROS -   Patient Reports :  see above  Patient Denies: Loss of Fluid, Vaginal Spotting, Vision Changes, Vomiting , and Epigastric pain  Fetal Movement : decreased  All other systems reviewed and are negative.       The additional following portions of the patient's history were " reviewed and updated as appropriate: allergies and current medications.    I have reviewed and agree with the HPI, ROS, and historical information as entered above. Chio Dumas MD      /62   Wt 118 kg (260 lb)   LMP 2023   BMI 43.27 kg/m²       EXAM:     Prenatal Vitals  BP: 108/62  Weight: 118 kg (260 lb)   Fetal Heart Rate: NST/ 148US               Urine Glucose Read-only: Negative  Urine Protein Read-only: Negative           Assessment and Plan    Problem List Items Addressed This Visit          Endocrine and Metabolic    Pregnancy complicated by pre-existing type 2 diabetes in third trimester    Overview     At 32 weeks - 4 pounds 15 ounces which is 69th percentile.  Of note AC was 85th percentile.    Recommend repeat at 37 weeks.  -Order in  Recommend twice weekly monitoring  2023-discussed suboptimal control of diabetes with Dr. Christie.  She recommended delivery between 37 and 38 weeks.         Relevant Medications    Blood Glucose Monitoring Suppl device    glucose blood test strip    Lancets 33G misc    Insulin Pen Needle 32G X 4 MM misc    insulin NPH (humuLIN N,novoLIN N) 100 UNIT/ML injection    Other Relevant Orders    US Fetal Biophysical Profile;With Non-Stress Testing (Completed)       Gravid and     Obesity in pregnancy, antepartum    Overview     Hemoglobin A1c at new OB 6.2  Early Glucola 149-needs 3-hour testing.  Recommend baby aspirin weeks 12 through 36  Growth ultrasounds at 32 and 37 weeks  Recommend carbohydrate control, daily walking, water intake.    Anatomy at 20/2. 3VC, Normal AF amount, EFW 14oz. AC 85%. CL 68mm. Anatomy seen appears normal but is incomplete, will f/u 4 wks.          Relevant Orders    US Fetal Biophysical Profile;With Non-Stress Testing (Completed)    History of  section    Overview     X2.  Desires repeat    Repeat scheduled on 2023 at 7:30 AM-orders in         Prenatal care, antepartum - Primary    Relevant  Orders    POC Urinalysis Dipstick (Completed)       Pregnancy at 35w0d  Fetal status reassuring.   Activity and Exercise discussed.  Fetal movement/PTL or Labor precautions  GBS next visit  Return in about 3 days (around 11/16/2023) for nst.    Chio Dumas MD  11/13/2023

## 2023-11-13 NOTE — PROGRESS NOTES
Clinical case reviewed with Dr Dumas   Considering T2DM with suboptimal glycemic control, recommend delivery at 37 - 38 weeks GA

## 2023-11-16 ENCOUNTER — ROUTINE PRENATAL (OUTPATIENT)
Dept: OBSTETRICS AND GYNECOLOGY | Facility: CLINIC | Age: 33
End: 2023-11-16
Payer: COMMERCIAL

## 2023-11-16 ENCOUNTER — TELEPHONE (OUTPATIENT)
Dept: OBSTETRICS AND GYNECOLOGY | Facility: CLINIC | Age: 33
End: 2023-11-16

## 2023-11-16 VITALS — BODY MASS INDEX: 43.5 KG/M2 | WEIGHT: 261.4 LBS | DIASTOLIC BLOOD PRESSURE: 70 MMHG | SYSTOLIC BLOOD PRESSURE: 112 MMHG

## 2023-11-16 DIAGNOSIS — O24.113 PREGNANCY COMPLICATED BY PRE-EXISTING TYPE 2 DIABETES IN THIRD TRIMESTER: Primary | ICD-10-CM

## 2023-11-16 DIAGNOSIS — Z34.90 PRENATAL CARE, ANTEPARTUM: ICD-10-CM

## 2023-11-16 DIAGNOSIS — O99.210 OBESITY IN PREGNANCY, ANTEPARTUM: ICD-10-CM

## 2023-11-16 DIAGNOSIS — Z98.891 HISTORY OF CESAREAN SECTION: ICD-10-CM

## 2023-11-16 LAB
GLUCOSE UR STRIP-MCNC: NEGATIVE MG/DL
PROT UR STRIP-MCNC: NEGATIVE MG/DL

## 2023-11-16 NOTE — TELEPHONE ENCOUNTER
Attempted to call patient to notify her of NST in labor son on 11/24 at 2pm. Pt did not answer. Left message with information and call back if she has any questions.

## 2023-11-16 NOTE — PROGRESS NOTES
OB FOLLOW UP  CC- Here for care of pregnancy        Sabrina Bass is a 33 y.o.  35w3d patient being seen today for her obstetrical follow up visit. Patient reports occasional nausea. Pt also reports one occurrence of pink tinged discharged with wiping yesterday.     Her prenatal care is complicated by (and status) : GDM requiring insulin. Her average fasting BG is . Her average 2hr PP BG is 95-99. Pt states she increased her insulin to 52 units. These blood sugars are the most recent since her visit on Monday.   Patient Active Problem List   Diagnosis    History of anal fissures    Obesity in pregnancy, antepartum    History of  section    Prenatal care, antepartum    Thyromegaly    Pregnancy complicated by pre-existing type 2 diabetes in third trimester       GBS Status:  To be completed on Monday    No Known Allergies       Flu Status: Already given in current flu season  Her Delivery Plan is: Repeat . Scheduled     today: no  Non Stress Test: Yes minutes 40  non-stress test: NST: Reactive  indication: Diabetes Mellitus Gestational insulin controlled      ROS -   Patient Denies: Loss of Fluid, Vaginal Spotting, Vision Changes, Headaches, Vomiting , and Epigastric pain  Fetal Movement : normal  All other systems reviewed and are negative.       The additional following portions of the patient's history were reviewed and updated as appropriate: allergies, current medications, past family history, past medical history, past social history, past surgical history, and problem list.    I have reviewed and agree with the HPI, ROS, and historical information as entered above. Chio Dumas MD        EXAM:     Prenatal Vitals  BP: 112/70  Weight: 119 kg (261 lb 6.4 oz)   Fetal Heart Rate: nst              Urine Glucose Read-only: Negative  Urine Protein Read-only: Negative           Assessment and Plan    Problem List Items Addressed This Visit          Endocrine and  Metabolic    Pregnancy complicated by pre-existing type 2 diabetes in third trimester - Primary    Overview     At 32 weeks - 4 pounds 15 ounces which is 69th percentile.  Of note AC was 85th percentile.    Recommend repeat at 37 weeks.  -Order in  Recommend twice weekly monitoring  2023-discussed suboptimal control of diabetes with Dr. Christie.  She recommended delivery between 37 and 38 weeks.         Relevant Medications    Blood Glucose Monitoring Suppl device    glucose blood test strip    Lancets 33G misc    Insulin Pen Needle 32G X 4 MM misc    insulin NPH (humuLIN N,novoLIN N) 100 UNIT/ML injection       Gravid and     Obesity in pregnancy, antepartum    Overview     Hemoglobin A1c at new OB 6.2  Early Glucola 149-needs 3-hour testing.  Recommend baby aspirin weeks 12 through 36  Growth ultrasounds at 32 and 37 weeks  Recommend carbohydrate control, daily walking, water intake.    Anatomy at 20/2. 3VC, Normal AF amount, EFW 14oz. AC 85%. CL 68mm. Anatomy seen appears normal but is incomplete, will f/u 4 wks.          History of  section    Overview     X2.  Desires repeat    Repeat scheduled on 2023 at 12 -orders in         Prenatal care, antepartum    Relevant Orders    POC Urinalysis Dipstick (Completed)       Pregnancy at 35w3d  Fetal status reassuring.   Reviewed FSBS goals: fasting <90, 2hr PP < 120  Reviewed Pre-eclampsia signs/symptoms  GBS next visit  Delivery options reviewed with patient  Signs of labor reviewed  Kick counts reviewed  Activity and Exercise discussed.  Return in about 4 days (around 2023) for nst.    Chio Dumas MD  2023

## 2023-11-17 DIAGNOSIS — O24.113 PREGNANCY COMPLICATED BY PRE-EXISTING TYPE 2 DIABETES IN THIRD TRIMESTER: ICD-10-CM

## 2023-11-17 DIAGNOSIS — O24.112 PREGNANCY COMPLICATED BY PRE-EXISTING TYPE 2 DIABETES IN SECOND TRIMESTER: ICD-10-CM

## 2023-11-17 RX ORDER — INSULIN HUMAN 100 [IU]/ML
INJECTION, SUSPENSION SUBCUTANEOUS
Qty: 15 ML | Refills: 1 | Status: SHIPPED | OUTPATIENT
Start: 2023-11-17

## 2023-11-17 NOTE — TELEPHONE ENCOUNTER
Rx Refill Note  Requested Prescriptions     Pending Prescriptions Disp Refills    Insulin NPH, Human,, Isophane, (HumuLIN N KwikPen) 100 UNIT/ML injection [Pharmacy Med Name: HumuLIN N 100 UNITS/ML KWIKPEN] 15 mL      Sig: INJECT 18 UNITS UNDER THE SKIN ONCE NIGHTLY AND TITRATE TO A MAX DAILY DOSE OF 50 UNITS      Last office visit with prescribing clinician: 10/26/2023     Next office visit with prescribing clinician: 3/1/2024       Franchesca Carrillo MA  11/17/23, 10:04 EST

## 2023-11-20 ENCOUNTER — LAB (OUTPATIENT)
Dept: LAB | Facility: HOSPITAL | Age: 33
End: 2023-11-20
Payer: COMMERCIAL

## 2023-11-20 ENCOUNTER — ROUTINE PRENATAL (OUTPATIENT)
Dept: OBSTETRICS AND GYNECOLOGY | Facility: CLINIC | Age: 33
End: 2023-11-20
Payer: COMMERCIAL

## 2023-11-20 VITALS — DIASTOLIC BLOOD PRESSURE: 84 MMHG | SYSTOLIC BLOOD PRESSURE: 122 MMHG | WEIGHT: 262.4 LBS | BODY MASS INDEX: 43.67 KG/M2

## 2023-11-20 DIAGNOSIS — Z98.891 HISTORY OF CESAREAN SECTION: ICD-10-CM

## 2023-11-20 DIAGNOSIS — E01.0 THYROMEGALY: ICD-10-CM

## 2023-11-20 DIAGNOSIS — Z87.19 HISTORY OF ANAL FISSURES: ICD-10-CM

## 2023-11-20 DIAGNOSIS — Z34.93 PRENATAL CARE IN THIRD TRIMESTER: ICD-10-CM

## 2023-11-20 DIAGNOSIS — O36.60X0 EXCESSIVE FETAL GROWTH AFFECTING MANAGEMENT OF PREGNANCY, ANTEPARTUM, SINGLE OR UNSPECIFIED FETUS: ICD-10-CM

## 2023-11-20 DIAGNOSIS — O28.8 NON-REACTIVE NST (NON-STRESS TEST): ICD-10-CM

## 2023-11-20 DIAGNOSIS — O24.113 PREGNANCY COMPLICATED BY PRE-EXISTING TYPE 2 DIABETES IN THIRD TRIMESTER: Primary | ICD-10-CM

## 2023-11-20 DIAGNOSIS — O99.210 OBESITY IN PREGNANCY, ANTEPARTUM: ICD-10-CM

## 2023-11-20 LAB
GLUCOSE UR STRIP-MCNC: NEGATIVE MG/DL
PROT UR STRIP-MCNC: NEGATIVE MG/DL

## 2023-11-20 PROCEDURE — 87081 CULTURE SCREEN ONLY: CPT

## 2023-11-20 NOTE — PROGRESS NOTES
OB FOLLOW UP  CC- Here for care of pregnancy        Sabrina Bass is a 33 y.o.  36w0d patient being seen today for her obstetrical follow up visit. Patient reports low back pain. Has not tried Tylenol. Used a belly band but was uncomfortable with sitting Heat helps. She denies dysuria. Reports heartburn she is taking Tums for treatment currently.     Patient reports Friday she had contractions every 15 minutes for an hour and a half and then stopped.     Her prenatal care is complicated by (and status) :  Type 2 diabetes requiring insulin: average fasting 70-95 average 2hr PP: less than 120  Patient Active Problem List   Diagnosis    History of anal fissures    Obesity in pregnancy, antepartum    History of  section    Prenatal care, antepartum    Thyromegaly    Pregnancy complicated by pre-existing type 2 diabetes in third trimester    Excessive fetal growth affecting management of mother, antepartum       GBS Status: Done Today. She is not allergic to PCN.    No Known Allergies       Flu Status: Desires at future appt  Her Delivery Plan is: Repeat . Scheduled    US today: no  Non Stress Test: Yes minutes 20  non-stress test: NST: Non-Reactive but reassuring  indication:  Diabetes      ROS -   Patient Reports :  see above  Patient Denies: Loss of Fluid, Vaginal Spotting, Vision Changes, Vomiting , and Epigastric pain  Fetal Movement : normal  All other systems reviewed and are negative.       The additional following portions of the patient's history were reviewed and updated as appropriate: allergies and current medications.    I have reviewed and agree with the HPI, ROS, and historical information as entered above. Yolanda Alcantara, APRN        EXAM:     Prenatal Vitals  BP: 122/84  Weight: 119 kg (262 lb 6.4 oz)   Fetal Heart Rate: NST            Urine Glucose Read-only: Negative  Urine Protein Read-only: Negative       Assessment and Plan    Problem List Items Addressed This  Visit       History of anal fissures    Overview     And hemorrhoidectomy with repair on 2021         Obesity in pregnancy, antepartum    Overview     Hemoglobin A1c at new OB 6.2  Early Glucola 149-needs 3-hour testing.  Recommend baby aspirin weeks 12 through 36  Growth ultrasounds at 32 and 37 weeks  Recommend carbohydrate control, daily walking, water intake.    Anatomy at 20/2. 3VC, Normal AF amount, EFW 14oz. AC 85%. CL 68mm. Anatomy seen appears normal but is incomplete, will f/u 4 wks.          Relevant Orders    US Fetal Biophysical Profile;With Non-Stress Testing    History of  section    Overview     X2.  Desires repeat    Repeat scheduled on 2023 at 12 -orders in         Thyromegaly    Overview     TSH decreased.  Recommend endocrine follow-up.         Pregnancy complicated by pre-existing type 2 diabetes in third trimester - Primary    Overview     At 32 weeks - 4 pounds 15 ounces which is 69th percentile.  Of note AC was 85th percentile.    Recommend repeat at 37 weeks.  -Order in  Recommend twice weekly monitoring  2023-discussed suboptimal control of diabetes with Dr. Christie.  She recommended delivery between 37 and 38 weeks.         Relevant Medications    Blood Glucose Monitoring Suppl device    glucose blood test strip    Lancets 33G misc    Insulin Pen Needle 32G X 4 MM misc    Insulin NPH, Human,, Isophane, (HumuLIN N KwikPen) 100 UNIT/ML injection    Other Relevant Orders    US Fetal Biophysical Profile;With Non-Stress Testing    Excessive fetal growth affecting management of mother, antepartum    Overview     US 2023: JIMBO 8cm, AC > 99%, EFW 96%. Patient reports having large babies. Repeat c/s scheduled on 2023. Large for gestation age education included in patient instructions. Encouraged to decrease carb and sugar intake. Reviewed FSBS goals: fasting <90, 2hr PP < 120.          Other Visit Diagnoses       Prenatal care in third trimester         Relevant Orders    POC Urinalysis Dipstick (Completed)    Group B Streptococcus Culture - Swab, Vaginal/Rectum    US Fetal Biophysical Profile;With Non-Stress Testing    Non-reactive NST (non-stress test)        Relevant Orders    US Fetal Biophysical Profile;With Non-Stress Testing            Pregnancy at 36w0d  NST non reactive but fetal status reassuring, US- BPP 8/8= 8/10  Reviewed US results w/ patient. LGA- see plan above.   Discussed/encouraged social distancing/COVID19 precautions; encouraged vaccination when able.   Declined flu vaccination.  Reviewed Pre-eclampsia signs/symptoms.  Reviewed upcoming c/s and PAT appt.   Delivery options reviewed with patient  Signs of labor reviewed  Kick counts reviewed  Activity and Exercise discussed.  Belly band, Tylenol sparingly PRN, and heating pad to back only.   Can start taking Pepcid, Protonix, or Prilosec if Tums is not working. Eat frequent small meals Q1-2h, bland or dry foods, high protein meals or snacks, avoid spicy and greasy foods.  11/24 NST in version room  Return for Piter Peralta, Sujata, BPP.    Yolanda Alcantara, APRN  11/20/2023

## 2023-11-23 LAB — BACTERIA SPEC AEROBE CULT: NORMAL

## 2023-11-24 ENCOUNTER — HOSPITAL ENCOUNTER (OUTPATIENT)
Facility: HOSPITAL | Age: 33
Discharge: HOME | End: 2023-11-24
Attending: OBSTETRICS & GYNECOLOGY | Admitting: OBSTETRICS & GYNECOLOGY
Payer: COMMERCIAL

## 2023-11-24 VITALS
DIASTOLIC BLOOD PRESSURE: 88 MMHG | SYSTOLIC BLOOD PRESSURE: 126 MMHG | HEART RATE: 74 BPM | RESPIRATION RATE: 18 BRPM | TEMPERATURE: 98 F

## 2023-11-24 PROBLEM — Z34.93 THIRD TRIMESTER PREGNANCY: Status: ACTIVE | Noted: 2023-11-24

## 2023-11-24 PROCEDURE — G0463 HOSPITAL OUTPT CLINIC VISIT: HCPCS

## 2023-11-24 PROCEDURE — 59025 FETAL NON-STRESS TEST: CPT

## 2023-11-24 PROCEDURE — 59025 FETAL NON-STRESS TEST: CPT | Performed by: OBSTETRICS & GYNECOLOGY

## 2023-11-24 NOTE — PROGRESS NOTES
Laborist    Nst : Indications gestational diabetes  Potation reactive, moderate variability, accelerations present 15 x 15, no fetal deceleration noted, onset 1300 end time 1328, no regular contractions noted.  Discharged home  Kick count, labor instruction given, follow-up with provider in 3 days for continued  care.  All questions answered.

## 2023-11-27 ENCOUNTER — ROUTINE PRENATAL (OUTPATIENT)
Dept: OBSTETRICS AND GYNECOLOGY | Facility: CLINIC | Age: 33
End: 2023-11-27
Payer: COMMERCIAL

## 2023-11-27 ENCOUNTER — PRE-ADMISSION TESTING (OUTPATIENT)
Dept: PREADMISSION TESTING | Facility: HOSPITAL | Age: 33
End: 2023-11-27
Payer: COMMERCIAL

## 2023-11-27 VITALS — WEIGHT: 266.6 LBS | BODY MASS INDEX: 44.36 KG/M2 | DIASTOLIC BLOOD PRESSURE: 68 MMHG | SYSTOLIC BLOOD PRESSURE: 122 MMHG

## 2023-11-27 DIAGNOSIS — Z34.90 PRENATAL CARE, ANTEPARTUM: ICD-10-CM

## 2023-11-27 DIAGNOSIS — O36.60X0 EXCESSIVE FETAL GROWTH AFFECTING MANAGEMENT OF PREGNANCY, ANTEPARTUM, SINGLE OR UNSPECIFIED FETUS: ICD-10-CM

## 2023-11-27 DIAGNOSIS — Z98.891 HISTORY OF CESAREAN SECTION: ICD-10-CM

## 2023-11-27 DIAGNOSIS — O24.113 PREGNANCY COMPLICATED BY PRE-EXISTING TYPE 2 DIABETES IN THIRD TRIMESTER: Primary | ICD-10-CM

## 2023-11-27 DIAGNOSIS — O99.210 OBESITY IN PREGNANCY, ANTEPARTUM: ICD-10-CM

## 2023-11-27 LAB
ABO GROUP BLD: NORMAL
BLD GP AB SCN SERPL QL: NEGATIVE
DEPRECATED RDW RBC AUTO: 46.5 FL (ref 37–54)
ERYTHROCYTE [DISTWIDTH] IN BLOOD BY AUTOMATED COUNT: 13.5 % (ref 12.3–15.4)
HCT VFR BLD AUTO: 34.4 % (ref 34–46.6)
HGB BLD-MCNC: 11.6 G/DL (ref 12–15.9)
MCH RBC QN AUTO: 31.5 PG (ref 26.6–33)
MCHC RBC AUTO-ENTMCNC: 33.7 G/DL (ref 31.5–35.7)
MCV RBC AUTO: 93.5 FL (ref 79–97)
PLATELET # BLD AUTO: 141 10*3/MM3 (ref 140–450)
PMV BLD AUTO: 11.9 FL (ref 6–12)
RBC # BLD AUTO: 3.68 10*6/MM3 (ref 3.77–5.28)
RH BLD: POSITIVE
T&S EXPIRATION DATE: NORMAL
WBC NRBC COR # BLD AUTO: 8.5 10*3/MM3 (ref 3.4–10.8)

## 2023-11-27 PROCEDURE — 86850 RBC ANTIBODY SCREEN: CPT

## 2023-11-27 PROCEDURE — 86901 BLOOD TYPING SEROLOGIC RH(D): CPT

## 2023-11-27 PROCEDURE — 0502F SUBSEQUENT PRENATAL CARE: CPT | Performed by: OBSTETRICS & GYNECOLOGY

## 2023-11-27 PROCEDURE — 85027 COMPLETE CBC AUTOMATED: CPT

## 2023-11-27 PROCEDURE — 86900 BLOOD TYPING SEROLOGIC ABO: CPT

## 2023-11-27 PROCEDURE — 36415 COLL VENOUS BLD VENIPUNCTURE: CPT

## 2023-11-27 NOTE — DISCHARGE INSTRUCTIONS
What to know before your arrive:    -Do not eat, drink or chew gum beginning 8 hours before your scheduled arrival time to the hospital.  Except please drink 20 ounces of Gatorade and complete two hours before your given arrival time to the hospital.  If you drink too close to surgery time, your sugery may  be delayed or cancelled.  Please complete as instructed.     -Do not shave any part of your body including abdomen or pelvic are for two days before your procedure.  -If you are taking a scheduled medication (insulin, blood pressure medicine,antibiotics) please consult with your physician whether to take on the day of surgery.  -Remove all jewelry including rings, wedding bands, and piercing before coming to the hospital.  -Leave important valuables at home.  -Do not wear dark fingernail polish.  -Please take two Tylenol 500 mg tablets the evening before surgery.  -Bring the following with you to the hospital:    -Picture ID and insurance, Medicare or Medicaid cards    -Co-pay/deductible required by insurance (Cash, Check, Credit Card)    -Copy of living will or power  document (if applicable)    -CPAP mask and tubing, not machine (if applicable)    -Skin prep instructions sheet    What to know the day of procedure:    -Park in the [facility], take elevator for first floor, exit to the right and  proceed through the doors to outside, follow the covered sidewalk to the entrance of the [facility], [facility], enter the [facility] to your right BEFORE entering the 1720 lobby.  Take the elevators to the 3rd floor ([address] [facility]).  -Leave unnecessary items in your vehicle, including your suitcase.  Your support  person or a family member can get it for you after your procedure.   -Check in at the reception desk in the lobby of the 3rd floor ([address] [facility]).   -One person may accompany you to the pre-op/recovery area.  Please have  other family members wait in the  waiting room.   -An anesthesiologist will meet with your prior to your procedure.   -After anesthesia has been initiated, one person may accompany you in the  operating room.   -No video cameras are permitted in the operating room; only still cameras,  Please.      What to expect while you are in recovery:     -One person may stay with you while you are in recovery.   -If the baby is stable, he/she may visit to initiate breastfeeding & Kangaroo Care.      CHLORHEXIDINE GLUCONATE WIPES AND INSTRUCTIONS GIVEN TO PATIENT

## 2023-11-27 NOTE — PROGRESS NOTES
OB FOLLOW UP  CC- Here for care of pregnancy        Sabrina Bass is a 33 y.o.  37w0d patient being seen today for her obstetrical follow up visit. Patient reports occasional nausea and BH contractions. Pt also reports drainage from her sinus.     Pt has not left a urine sample today.     Her prenatal care is complicated by (and status) : Type Type 2, insulin-dependent DM. Her average fasting BG is 70-80s. Her average 2hr PP BG is <120s. Pt is currently taking 52 units of insulin.   Patient Active Problem List   Diagnosis    History of anal fissures    Obesity in pregnancy, antepartum    History of  section    Prenatal care, antepartum    Thyromegaly    Pregnancy complicated by pre-existing type 2 diabetes in third trimester    Excessive fetal growth affecting management of mother, antepartum    Third trimester pregnancy       GBS Status:   Group B Strep Culture   Date Value Ref Range Status   2023 No Group B Streptococcus isolated  Final         No Known Allergies       Flu Status: Declines  Her Delivery Plan is: Repeat . Scheduled  23  US done today: Yes.  Findings showed BPP 8 out of 8.  MVP 2.9..  I have personally evaluated the U/S and agree with the findings. Chio Dumas MD    Non Stress Test: no    ROS -   Patient Denies: Loss of Fluid, Vaginal Spotting, Vision Changes, Headaches, Vomiting , and Epigastric pain  Fetal Movement : normal  All other systems reviewed and are negative.       The additional following portions of the patient's history were reviewed and updated as appropriate: allergies, current medications, past family history, past medical history, past social history, past surgical history, and problem list.    I have reviewed and agree with the HPI, ROS, and historical information as entered above. Chio Dumas MD        EXAM:     Prenatal Vitals  BP: 122/68  Weight: 121 kg (266 lb 9.6 oz)   Fetal Heart Rate: 148                           Assessment and Plan    Problem List Items Addressed This Visit          Endocrine and Metabolic    Pregnancy complicated by pre-existing type 2 diabetes in third trimester - Primary    Overview     At 32 weeks - 4 pounds 15 ounces which is 69th percentile.  Of note AC was 85th percentile.    Recommend repeat at 37 weeks.  -Order in  Recommend twice weekly monitoring  2023-discussed suboptimal control of diabetes with Dr. Christie.  She recommended delivery between 37 and 38 weeks.         Relevant Medications    Blood Glucose Monitoring Suppl device    glucose blood test strip    Lancets 33G misc    Insulin Pen Needle 32G X 4 MM misc    Insulin NPH, Human,, Isophane, (HumuLIN N KwikPen) 100 UNIT/ML injection       Gravid and     Obesity in pregnancy, antepartum    Overview     Hemoglobin A1c at new OB 6.2  Early Glucola 149-needs 3-hour testing.  Recommend baby aspirin weeks 12 through 36  Growth ultrasounds at 32 and 37 weeks  Recommend carbohydrate control, daily walking, water intake.    Anatomy at 20/2. 3VC, Normal AF amount, EFW 14oz. AC 85%. CL 68mm. Anatomy seen appears normal but is incomplete, will f/u 4 wks.          History of  section    Overview     X2.  Desires repeat    Repeat scheduled on 2023 at 12 -orders in         Prenatal care, antepartum    Relevant Orders    POC Urinalysis Dipstick    Excessive fetal growth affecting management of mother, antepartum    Overview     US 2023: JIMBO 8cm, AC > 99%, EFW 96%. Patient reports having large babies. Repeat c/s scheduled on 2023. Large for gestation age education included in patient instructions. Encouraged to decrease carb and sugar intake. Reviewed FSBS goals: fasting <90, 2hr PP < 120.            Pregnancy at 37w0d  Fetal status reassuring.   Reviewed Pre-eclampsia signs/symptoms  Reviewed upcoming c/s and PAT instructions with patient. Arrival time and NPO status reviewed.   Delivery options  reviewed with patient  Signs of labor reviewed  Kick counts reviewed  Activity and Exercise discussed.  Return in about 2 weeks (around 12/11/2023) for With nurse practitioner for incision check..    Chio Dumas MD  11/27/2023

## 2023-11-27 NOTE — PAT
An arrival time for procedure was not provided during PAT visit. If patient had any questions or concerns about their arrival time, they were instructed to contact their surgeon/physician.  Additionally, if the patient referred to an arrival time that was acquired from their my chart account, patient was encouraged to verify that time with their surgeon/physician. Arrival times are NOT provided in Pre Admission Testing Department.    Blood bank bracelet applied to patient during Pre Admission Testing visit.  Patient instructed not to remove from arm until after procedure and they are discharged from the hospital.  Explained to patient that they may shower and get the bracelet wet, but not to immerse under water for longer periods (bathing, swimming, hand dishwashing, etc).  Patient verbalized understanding.    Patient instructed to drink 20 ounces of Gatorade or Gatorlyte (if diabetic) and it needs to be completed 1 hour (for Main OR patients) or 2 hours (scheduled  section & [facility] patients) before given arrival time for procedure (NO RED Gatorade and NO Gatorade Zero).    Patient verbalized understanding.    Patient denies any current skin issues.     Instructed patient to take two Tylenol extra strength (total of 1000 mg) the night before surgery.    Patient to apply Chlorhexadine wipes  to surgical area (as instructed) the night before procedure and the AM of procedure. Wipes provided.      Patient viewed general PAT education video as instructed in their preoperative information received from their surgeon.  Patient stated the general PAT education video was viewed in its entirety and survey completed.  Copies of PAT general education handouts (Incentive Spirometry, Meds to Beds Program, Patient Belongings, Pre-op skin preparation instructions, Blood Glucose testing, Visitor policy, Surgery FAQ, Code H) distributed to patient if not printed. Education related to the PAT pass and skin preparation for  surgery (if applicable) completed in PAT as a reinforcement to PAT education video. Patient instructed to return PAT pass provided today as well as completed skin preparation sheet (if applicable) on the day of procedure.     Additionally if patient had not viewed video yet but intended to view it at home or in our waiting area, then referred them to the handout with QR code/link provided during PAT visit.  Instructed patient to complete survey after viewing the video in its entirety.  Encouraged patient/family to read PAT general education handouts thoroughly and notify PAT staff with any questions or concerns. Patient verbalized understanding of all information and priority content.

## 2023-11-28 ENCOUNTER — ANESTHESIA EVENT (OUTPATIENT)
Dept: LABOR AND DELIVERY | Facility: HOSPITAL | Age: 33
End: 2023-11-28
Payer: COMMERCIAL

## 2023-11-28 ENCOUNTER — HOSPITAL ENCOUNTER (INPATIENT)
Facility: HOSPITAL | Age: 33
LOS: 3 days | Discharge: HOME | End: 2023-12-01
Attending: OBSTETRICS & GYNECOLOGY | Admitting: OBSTETRICS & GYNECOLOGY
Payer: COMMERCIAL

## 2023-11-28 ENCOUNTER — ANESTHESIA (OUTPATIENT)
Dept: LABOR AND DELIVERY | Facility: HOSPITAL | Age: 33
End: 2023-11-28
Payer: COMMERCIAL

## 2023-11-28 DIAGNOSIS — Z98.891 HISTORY OF CESAREAN SECTION: ICD-10-CM

## 2023-11-28 LAB
AMPHET+METHAMPHET UR QL: NEGATIVE
AMPHETAMINES UR QL: NEGATIVE
ATMOSPHERIC PRESS: ABNORMAL MM[HG]
ATMOSPHERIC PRESS: ABNORMAL MM[HG]
BARBITURATES UR QL SCN: NEGATIVE
BASE EXCESS BLDCOA CALC-SCNC: -0.8 MMOL/L (ref 0–2)
BASE EXCESS BLDCOV CALC-SCNC: 0.4 MMOL/L (ref 0–2)
BDY SITE: ABNORMAL
BDY SITE: ABNORMAL
BENZODIAZ UR QL SCN: NEGATIVE
BODY TEMPERATURE: 37
BODY TEMPERATURE: 37
BUPRENORPHINE SERPL-MCNC: NEGATIVE NG/ML
CANNABINOIDS SERPL QL: NEGATIVE
CO2 BLDA-SCNC: 29 MMOL/L (ref 22–33)
CO2 BLDA-SCNC: 30.8 MMOL/L (ref 22–33)
COCAINE UR QL: NEGATIVE
EPAP: 0
EPAP: 0
FENTANYL UR-MCNC: NEGATIVE NG/ML
GLUCOSE BLDC GLUCOMTR-MCNC: 103 MG/DL (ref 70–130)
GLUCOSE BLDC GLUCOMTR-MCNC: 74 MG/DL (ref 70–130)
HCO3 BLDCOA-SCNC: 28.7 MMOL/L (ref 16.9–20.5)
HCO3 BLDCOV-SCNC: 27.4 MMOL/L (ref 18.6–21.4)
HGB BLDA-MCNC: 15.4 G/DL (ref 14–18)
HGB BLDA-MCNC: 16.2 G/DL (ref 14–18)
INHALED O2 CONCENTRATION: 21 %
INHALED O2 CONCENTRATION: 21 %
IPAP: 0
IPAP: 0
METHADONE UR QL SCN: NEGATIVE
MODALITY: ABNORMAL
MODALITY: ABNORMAL
OPIATES UR QL: NEGATIVE
OXYCODONE UR QL SCN: NEGATIVE
PAW @ PEAK INSP FLOW SETTING VENT: 0 CMH2O
PAW @ PEAK INSP FLOW SETTING VENT: 0 CMH2O
PCO2 BLDCOA: 66.8 MMHG (ref 43.3–54.9)
PCO2 BLDCOV: 52.1 MM HG (ref 28–40)
PCP UR QL SCN: NEGATIVE
PH BLDCOA: 7.24 PH UNITS (ref 7.22–7.3)
PH BLDCOV: 7.33 PH UNITS (ref 7.31–7.37)
PO2 BLDCOA: 17.4 MMHG (ref 11.5–43.3)
PO2 BLDCOV: 29.2 MM HG (ref 21–31)
SAO2 % BLDCOA: 25.1 %
SAO2 % BLDCOA: ABNORMAL %
SAO2 % BLDCOV: 63.8 %
TOTAL RATE: 0 BREATHS/MINUTE
TOTAL RATE: 0 BREATHS/MINUTE
TRICYCLICS UR QL SCN: NEGATIVE
VENTILATOR MODE: ABNORMAL

## 2023-11-28 PROCEDURE — 25010000002 MIDAZOLAM PER 1 MG: Performed by: NURSE ANESTHETIST, CERTIFIED REGISTERED

## 2023-11-28 PROCEDURE — 25010000002 KETOROLAC TROMETHAMINE PER 15 MG: Performed by: OBSTETRICS & GYNECOLOGY

## 2023-11-28 PROCEDURE — C1765 ADHESION BARRIER: HCPCS | Performed by: OBSTETRICS & GYNECOLOGY

## 2023-11-28 PROCEDURE — 25010000002 FENTANYL CITRATE (PF) 50 MCG/ML SOLUTION: Performed by: NURSE ANESTHETIST, CERTIFIED REGISTERED

## 2023-11-28 PROCEDURE — 25010000002 METOCLOPRAMIDE PER 10 MG: Performed by: NURSE ANESTHETIST, CERTIFIED REGISTERED

## 2023-11-28 PROCEDURE — 80307 DRUG TEST PRSMV CHEM ANLYZR: CPT | Performed by: OBSTETRICS & GYNECOLOGY

## 2023-11-28 PROCEDURE — 25810000003 LACTATED RINGERS SOLUTION: Performed by: OBSTETRICS & GYNECOLOGY

## 2023-11-28 PROCEDURE — 25010000002 BUPIVACAINE IN DEXTROSE 0.75-8.25 % SOLUTION: Performed by: NURSE ANESTHETIST, CERTIFIED REGISTERED

## 2023-11-28 PROCEDURE — 25010000002 MORPHINE PER 10 MG: Performed by: NURSE ANESTHETIST, CERTIFIED REGISTERED

## 2023-11-28 PROCEDURE — 82948 REAGENT STRIP/BLOOD GLUCOSE: CPT

## 2023-11-28 PROCEDURE — 25010000002 ONDANSETRON PER 1 MG: Performed by: NURSE ANESTHETIST, CERTIFIED REGISTERED

## 2023-11-28 PROCEDURE — 25810000003 LACTATED RINGERS PER 1000 ML: Performed by: OBSTETRICS & GYNECOLOGY

## 2023-11-28 PROCEDURE — 25010000002 OXYTOCIN PER 10 UNITS: Performed by: NURSE ANESTHETIST, CERTIFIED REGISTERED

## 2023-11-28 PROCEDURE — 59025 FETAL NON-STRESS TEST: CPT

## 2023-11-28 PROCEDURE — 25010000002 CEFAZOLIN PER 500 MG

## 2023-11-28 PROCEDURE — 59510 CESAREAN DELIVERY: CPT | Performed by: OBSTETRICS & GYNECOLOGY

## 2023-11-28 PROCEDURE — 82805 BLOOD GASES W/O2 SATURATION: CPT

## 2023-11-28 DEVICE — ABSORBABLE ADHESION BARRIER
Type: IMPLANTABLE DEVICE | Site: UTERUS | Status: FUNCTIONAL
Brand: GYNECARE INTERCEED

## 2023-11-28 RX ORDER — ACETAMINOPHEN 500 MG
1000 TABLET ORAL EVERY 6 HOURS
Status: COMPLETED | OUTPATIENT
Start: 2023-11-28 | End: 2023-11-29

## 2023-11-28 RX ORDER — CEFAZOLIN SODIUM IN 0.9 % NACL 3 G/100 ML
3000 INTRAVENOUS SOLUTION, PIGGYBACK (ML) INTRAVENOUS ONCE
Status: COMPLETED | OUTPATIENT
Start: 2023-11-28 | End: 2023-11-28

## 2023-11-28 RX ORDER — OXYTOCIN/0.9 % SODIUM CHLORIDE 30/500 ML
125 PLASTIC BAG, INJECTION (ML) INTRAVENOUS CONTINUOUS PRN
Status: DISCONTINUED | OUTPATIENT
Start: 2023-11-28 | End: 2023-12-01 | Stop reason: HOSPADM

## 2023-11-28 RX ORDER — ALUMINA, MAGNESIA, AND SIMETHICONE 2400; 2400; 240 MG/30ML; MG/30ML; MG/30ML
15 SUSPENSION ORAL EVERY 4 HOURS PRN
Status: DISCONTINUED | OUTPATIENT
Start: 2023-11-28 | End: 2023-12-01 | Stop reason: HOSPADM

## 2023-11-28 RX ORDER — MIDAZOLAM HYDROCHLORIDE 1 MG/ML
INJECTION INTRAMUSCULAR; INTRAVENOUS AS NEEDED
Status: DISCONTINUED | OUTPATIENT
Start: 2023-11-28 | End: 2023-11-28 | Stop reason: SURG

## 2023-11-28 RX ORDER — PROMETHAZINE HYDROCHLORIDE 25 MG/1
25 TABLET ORAL EVERY 6 HOURS PRN
Status: DISCONTINUED | OUTPATIENT
Start: 2023-11-28 | End: 2023-12-01 | Stop reason: HOSPADM

## 2023-11-28 RX ORDER — SODIUM CHLORIDE 9 MG/ML
40 INJECTION, SOLUTION INTRAVENOUS AS NEEDED
Status: DISCONTINUED | OUTPATIENT
Start: 2023-11-28 | End: 2023-11-28 | Stop reason: HOSPADM

## 2023-11-28 RX ORDER — MISOPROSTOL 200 UG/1
800 TABLET ORAL AS NEEDED
Status: DISCONTINUED | OUTPATIENT
Start: 2023-11-28 | End: 2023-11-28 | Stop reason: SDUPTHER

## 2023-11-28 RX ORDER — SODIUM CHLORIDE 0.9 % (FLUSH) 0.9 %
1-10 SYRINGE (ML) INJECTION AS NEEDED
Status: DISCONTINUED | OUTPATIENT
Start: 2023-11-28 | End: 2023-12-01 | Stop reason: HOSPADM

## 2023-11-28 RX ORDER — NALOXONE HCL 0.4 MG/ML
0.4 VIAL (ML) INJECTION
Status: ACTIVE | OUTPATIENT
Start: 2023-11-28 | End: 2023-11-29

## 2023-11-28 RX ORDER — METHYLERGONOVINE MALEATE 0.2 MG/ML
200 INJECTION INTRAVENOUS AS NEEDED
Status: DISCONTINUED | OUTPATIENT
Start: 2023-11-28 | End: 2023-12-01 | Stop reason: HOSPADM

## 2023-11-28 RX ORDER — DIPHENHYDRAMINE HYDROCHLORIDE 50 MG/ML
25 INJECTION INTRAMUSCULAR; INTRAVENOUS EVERY 4 HOURS PRN
Status: DISCONTINUED | OUTPATIENT
Start: 2023-11-28 | End: 2023-12-01 | Stop reason: HOSPADM

## 2023-11-28 RX ORDER — FENTANYL CITRATE 50 UG/ML
INJECTION, SOLUTION INTRAMUSCULAR; INTRAVENOUS AS NEEDED
Status: DISCONTINUED | OUTPATIENT
Start: 2023-11-28 | End: 2023-11-28 | Stop reason: SURG

## 2023-11-28 RX ORDER — SODIUM CHLORIDE 0.9 % (FLUSH) 0.9 %
10 SYRINGE (ML) INJECTION EVERY 12 HOURS SCHEDULED
Status: DISCONTINUED | OUTPATIENT
Start: 2023-11-28 | End: 2023-12-01 | Stop reason: HOSPADM

## 2023-11-28 RX ORDER — CITRIC ACID/SODIUM CITRATE 334-500MG
30 SOLUTION, ORAL ORAL ONCE
Status: COMPLETED | OUTPATIENT
Start: 2023-11-28 | End: 2023-11-28

## 2023-11-28 RX ORDER — DIPHENHYDRAMINE HCL 25 MG
25 CAPSULE ORAL EVERY 4 HOURS PRN
Status: DISCONTINUED | OUTPATIENT
Start: 2023-11-28 | End: 2023-12-01 | Stop reason: HOSPADM

## 2023-11-28 RX ORDER — MISOPROSTOL 200 UG/1
600 TABLET ORAL AS NEEDED
Status: DISCONTINUED | OUTPATIENT
Start: 2023-11-28 | End: 2023-12-01 | Stop reason: HOSPADM

## 2023-11-28 RX ORDER — GLYCOPYRROLATE 0.2 MG/ML
INJECTION INTRAMUSCULAR; INTRAVENOUS AS NEEDED
Status: DISCONTINUED | OUTPATIENT
Start: 2023-11-28 | End: 2023-11-28 | Stop reason: SURG

## 2023-11-28 RX ORDER — OXYCODONE HYDROCHLORIDE 5 MG/1
5 TABLET ORAL EVERY 4 HOURS PRN
Status: DISCONTINUED | OUTPATIENT
Start: 2023-11-28 | End: 2023-12-01 | Stop reason: HOSPADM

## 2023-11-28 RX ORDER — CALCIUM CARBONATE 500 MG/1
1 TABLET, CHEWABLE ORAL EVERY 4 HOURS PRN
Status: DISCONTINUED | OUTPATIENT
Start: 2023-11-28 | End: 2023-12-01 | Stop reason: HOSPADM

## 2023-11-28 RX ORDER — OXYTOCIN/0.9 % SODIUM CHLORIDE 30/500 ML
PLASTIC BAG, INJECTION (ML) INTRAVENOUS CONTINUOUS PRN
Status: DISCONTINUED | OUTPATIENT
Start: 2023-11-28 | End: 2023-11-28 | Stop reason: SURG

## 2023-11-28 RX ORDER — EPHEDRINE SULFATE 5 MG/ML
INJECTION INTRAVENOUS AS NEEDED
Status: DISCONTINUED | OUTPATIENT
Start: 2023-11-28 | End: 2023-11-28 | Stop reason: SURG

## 2023-11-28 RX ORDER — ONDANSETRON 2 MG/ML
4 INJECTION INTRAMUSCULAR; INTRAVENOUS ONCE AS NEEDED
Status: DISCONTINUED | OUTPATIENT
Start: 2023-11-28 | End: 2023-11-28

## 2023-11-28 RX ORDER — HYDROCORTISONE 25 MG/G
1 CREAM TOPICAL AS NEEDED
Status: DISCONTINUED | OUTPATIENT
Start: 2023-11-28 | End: 2023-12-01 | Stop reason: HOSPADM

## 2023-11-28 RX ORDER — PROMETHAZINE HYDROCHLORIDE 12.5 MG/1
12.5 SUPPOSITORY RECTAL EVERY 6 HOURS PRN
Status: DISCONTINUED | OUTPATIENT
Start: 2023-11-28 | End: 2023-12-01 | Stop reason: HOSPADM

## 2023-11-28 RX ORDER — SIMETHICONE 80 MG
80 TABLET,CHEWABLE ORAL 4 TIMES DAILY PRN
Status: DISCONTINUED | OUTPATIENT
Start: 2023-11-28 | End: 2023-12-01 | Stop reason: HOSPADM

## 2023-11-28 RX ORDER — METHYLERGONOVINE MALEATE 0.2 MG/ML
200 INJECTION INTRAVENOUS ONCE AS NEEDED
Status: DISCONTINUED | OUTPATIENT
Start: 2023-11-28 | End: 2023-11-28 | Stop reason: SDUPTHER

## 2023-11-28 RX ORDER — OXYTOCIN 10 [USP'U]/ML
INJECTION, SOLUTION INTRAMUSCULAR; INTRAVENOUS AS NEEDED
Status: DISCONTINUED | OUTPATIENT
Start: 2023-11-28 | End: 2023-11-28 | Stop reason: SURG

## 2023-11-28 RX ORDER — IBUPROFEN 600 MG/1
600 TABLET ORAL EVERY 6 HOURS
Status: DISCONTINUED | OUTPATIENT
Start: 2023-11-29 | End: 2023-12-01 | Stop reason: HOSPADM

## 2023-11-28 RX ORDER — ACETAMINOPHEN 500 MG
1000 TABLET ORAL ONCE
Status: COMPLETED | OUTPATIENT
Start: 2023-11-28 | End: 2023-11-28

## 2023-11-28 RX ORDER — OXYTOCIN/0.9 % SODIUM CHLORIDE 30/500 ML
85 PLASTIC BAG, INJECTION (ML) INTRAVENOUS ONCE
Status: COMPLETED | OUTPATIENT
Start: 2023-11-28 | End: 2023-11-28

## 2023-11-28 RX ORDER — BUPIVACAINE HYDROCHLORIDE 7.5 MG/ML
INJECTION, SOLUTION INTRASPINAL AS NEEDED
Status: DISCONTINUED | OUTPATIENT
Start: 2023-11-28 | End: 2023-11-28 | Stop reason: SURG

## 2023-11-28 RX ORDER — METOCLOPRAMIDE HYDROCHLORIDE 5 MG/ML
INJECTION INTRAMUSCULAR; INTRAVENOUS AS NEEDED
Status: DISCONTINUED | OUTPATIENT
Start: 2023-11-28 | End: 2023-11-28 | Stop reason: SURG

## 2023-11-28 RX ORDER — DOCUSATE SODIUM 100 MG/1
100 CAPSULE, LIQUID FILLED ORAL 2 TIMES DAILY PRN
Status: DISCONTINUED | OUTPATIENT
Start: 2023-11-28 | End: 2023-12-01 | Stop reason: HOSPADM

## 2023-11-28 RX ORDER — OXYTOCIN/0.9 % SODIUM CHLORIDE 30/500 ML
650 PLASTIC BAG, INJECTION (ML) INTRAVENOUS ONCE
Status: DISCONTINUED | OUTPATIENT
Start: 2023-11-28 | End: 2023-11-28 | Stop reason: HOSPADM

## 2023-11-28 RX ORDER — SODIUM CHLORIDE 0.9 % (FLUSH) 0.9 %
10 SYRINGE (ML) INJECTION EVERY 12 HOURS SCHEDULED
Status: DISCONTINUED | OUTPATIENT
Start: 2023-11-28 | End: 2023-11-28 | Stop reason: HOSPADM

## 2023-11-28 RX ORDER — ONDANSETRON 2 MG/ML
INJECTION INTRAMUSCULAR; INTRAVENOUS AS NEEDED
Status: DISCONTINUED | OUTPATIENT
Start: 2023-11-28 | End: 2023-11-28 | Stop reason: SURG

## 2023-11-28 RX ORDER — MORPHINE SULFATE 0.5 MG/ML
INJECTION, SOLUTION EPIDURAL; INTRATHECAL; INTRAVENOUS AS NEEDED
Status: DISCONTINUED | OUTPATIENT
Start: 2023-11-28 | End: 2023-11-28 | Stop reason: SURG

## 2023-11-28 RX ORDER — SODIUM CHLORIDE 9 MG/ML
40 INJECTION, SOLUTION INTRAVENOUS AS NEEDED
Status: DISCONTINUED | OUTPATIENT
Start: 2023-11-28 | End: 2023-12-01 | Stop reason: HOSPADM

## 2023-11-28 RX ORDER — FAMOTIDINE 10 MG/ML
INJECTION, SOLUTION INTRAVENOUS AS NEEDED
Status: DISCONTINUED | OUTPATIENT
Start: 2023-11-28 | End: 2023-11-28 | Stop reason: SURG

## 2023-11-28 RX ORDER — PRENATAL VIT/IRON FUM/FOLIC AC 27MG-0.8MG
1 TABLET ORAL DAILY
Status: DISCONTINUED | OUTPATIENT
Start: 2023-11-28 | End: 2023-12-01 | Stop reason: HOSPADM

## 2023-11-28 RX ORDER — KETOROLAC TROMETHAMINE 15 MG/ML
15 INJECTION, SOLUTION INTRAMUSCULAR; INTRAVENOUS EVERY 6 HOURS
Status: COMPLETED | OUTPATIENT
Start: 2023-11-28 | End: 2023-11-29

## 2023-11-28 RX ORDER — ACETAMINOPHEN 325 MG/1
650 TABLET ORAL EVERY 6 HOURS
Status: DISCONTINUED | OUTPATIENT
Start: 2023-11-29 | End: 2023-12-01 | Stop reason: HOSPADM

## 2023-11-28 RX ORDER — SODIUM CHLORIDE, SODIUM LACTATE, POTASSIUM CHLORIDE, CALCIUM CHLORIDE 600; 310; 30; 20 MG/100ML; MG/100ML; MG/100ML; MG/100ML
125 INJECTION, SOLUTION INTRAVENOUS CONTINUOUS
Status: DISCONTINUED | OUTPATIENT
Start: 2023-11-28 | End: 2023-11-29

## 2023-11-28 RX ORDER — OXYCODONE HYDROCHLORIDE 10 MG/1
10 TABLET ORAL EVERY 4 HOURS PRN
Status: DISCONTINUED | OUTPATIENT
Start: 2023-11-28 | End: 2023-12-01 | Stop reason: HOSPADM

## 2023-11-28 RX ORDER — LIDOCAINE HYDROCHLORIDE 10 MG/ML
0.5 INJECTION, SOLUTION EPIDURAL; INFILTRATION; INTRACAUDAL; PERINEURAL ONCE AS NEEDED
Status: DISCONTINUED | OUTPATIENT
Start: 2023-11-28 | End: 2023-11-28 | Stop reason: HOSPADM

## 2023-11-28 RX ORDER — KETOROLAC TROMETHAMINE 30 MG/ML
30 INJECTION, SOLUTION INTRAMUSCULAR; INTRAVENOUS ONCE
Status: COMPLETED | OUTPATIENT
Start: 2023-11-28 | End: 2023-11-28

## 2023-11-28 RX ORDER — CARBOPROST TROMETHAMINE 250 UG/ML
250 INJECTION, SOLUTION INTRAMUSCULAR AS NEEDED
Status: DISCONTINUED | OUTPATIENT
Start: 2023-11-28 | End: 2023-11-28 | Stop reason: SDUPTHER

## 2023-11-28 RX ORDER — ONDANSETRON 4 MG/1
4 TABLET, FILM COATED ORAL EVERY 8 HOURS PRN
Status: DISCONTINUED | OUTPATIENT
Start: 2023-11-28 | End: 2023-12-01 | Stop reason: HOSPADM

## 2023-11-28 RX ORDER — FENTANYL CITRATE 50 UG/ML
50 INJECTION, SOLUTION INTRAMUSCULAR; INTRAVENOUS
Status: DISCONTINUED | OUTPATIENT
Start: 2023-11-28 | End: 2023-11-28

## 2023-11-28 RX ORDER — SODIUM CHLORIDE 0.9 % (FLUSH) 0.9 %
10 SYRINGE (ML) INJECTION AS NEEDED
Status: DISCONTINUED | OUTPATIENT
Start: 2023-11-28 | End: 2023-11-28 | Stop reason: HOSPADM

## 2023-11-28 RX ORDER — CARBOPROST TROMETHAMINE 250 UG/ML
250 INJECTION, SOLUTION INTRAMUSCULAR AS NEEDED
Status: DISCONTINUED | OUTPATIENT
Start: 2023-11-28 | End: 2023-12-01 | Stop reason: HOSPADM

## 2023-11-28 RX ADMIN — SODIUM CHLORIDE, POTASSIUM CHLORIDE, SODIUM LACTATE AND CALCIUM CHLORIDE 1000 ML/HR: 600; 310; 30; 20 INJECTION, SOLUTION INTRAVENOUS at 12:05

## 2023-11-28 RX ADMIN — ACETAMINOPHEN 1000 MG: 500 TABLET, FILM COATED ORAL at 18:31

## 2023-11-28 RX ADMIN — SODIUM CITRATE AND CITRIC ACID MONOHYDRATE 30 ML: 334; 500 SOLUTION ORAL at 12:16

## 2023-11-28 RX ADMIN — KETOROLAC TROMETHAMINE 30 MG: 30 INJECTION, SOLUTION INTRAMUSCULAR; INTRAVENOUS at 14:20

## 2023-11-28 RX ADMIN — MORPHINE SULFATE 0.1 MG: 0.5 INJECTION EPIDURAL; INTRATHECAL; INTRAVENOUS at 12:42

## 2023-11-28 RX ADMIN — EPHEDRINE SULFATE 10 MG: 5 INJECTION INTRAVENOUS at 12:53

## 2023-11-28 RX ADMIN — SODIUM CHLORIDE, POTASSIUM CHLORIDE, SODIUM LACTATE AND CALCIUM CHLORIDE 1000 ML: 600; 310; 30; 20 INJECTION, SOLUTION INTRAVENOUS at 11:37

## 2023-11-28 RX ADMIN — OXYTOCIN 5 UNITS: 10 INJECTION INTRAVENOUS at 13:20

## 2023-11-28 RX ADMIN — MIDAZOLAM 1 MG: 1 INJECTION INTRAMUSCULAR; INTRAVENOUS at 12:33

## 2023-11-28 RX ADMIN — Medication 1000 ML/HR: at 13:05

## 2023-11-28 RX ADMIN — SODIUM CHLORIDE, POTASSIUM CHLORIDE, SODIUM LACTATE AND CALCIUM CHLORIDE: 600; 310; 30; 20 INJECTION, SOLUTION INTRAVENOUS at 12:55

## 2023-11-28 RX ADMIN — FENTANYL CITRATE 20 MCG: 50 INJECTION, SOLUTION INTRAMUSCULAR; INTRAVENOUS at 12:42

## 2023-11-28 RX ADMIN — FAMOTIDINE 20 MG: 10 INJECTION INTRAVENOUS at 12:34

## 2023-11-28 RX ADMIN — KETOROLAC TROMETHAMINE 15 MG: 15 INJECTION, SOLUTION INTRAMUSCULAR; INTRAVENOUS at 20:49

## 2023-11-28 RX ADMIN — CEFAZOLIN 3000 MG: 10 INJECTION, POWDER, FOR SOLUTION INTRAVENOUS at 12:05

## 2023-11-28 RX ADMIN — SODIUM CHLORIDE 100 ML: 900 INJECTION INTRAVENOUS at 11:55

## 2023-11-28 RX ADMIN — EPHEDRINE SULFATE 10 MG: 5 INJECTION INTRAVENOUS at 13:10

## 2023-11-28 RX ADMIN — ACETAMINOPHEN 1000 MG: 500 TABLET ORAL at 11:48

## 2023-11-28 RX ADMIN — ONDANSETRON 4 MG: 2 INJECTION INTRAMUSCULAR; INTRAVENOUS at 12:34

## 2023-11-28 RX ADMIN — OXYTOCIN 5 UNITS: 10 INJECTION INTRAVENOUS at 13:06

## 2023-11-28 RX ADMIN — OXYCODONE HYDROCHLORIDE 5 MG: 5 TABLET ORAL at 16:41

## 2023-11-28 RX ADMIN — BUPIVACAINE HYDROCHLORIDE IN DEXTROSE 1.5 ML: 7.5 INJECTION, SOLUTION SUBARACHNOID at 12:42

## 2023-11-28 RX ADMIN — GLYCOPYRROLATE 0.3 MG: 0.4 INJECTION INTRAMUSCULAR; INTRAVENOUS at 12:49

## 2023-11-28 RX ADMIN — METOCLOPRAMIDE 10 MG: 5 INJECTION, SOLUTION INTRAMUSCULAR; INTRAVENOUS at 12:34

## 2023-11-28 RX ADMIN — Medication 85 ML/HR: at 14:17

## 2023-11-28 NOTE — LACTATION NOTE
11/28/23 1700   Maternal Information   Date of Referral 11/28/23   Person Making Referral lactation consultant   Maternal Reason for Referral separation from infant   Infant Reason for Referral separation from mother   Maternal Assessment   Left Nipple Symptoms nontender   Right Nipple Symptoms nontender   Maternal Infant Feeding   Maternal Emotional State distracted;receptive   Milk Expression/Equipment   Breast Pump Type double electric, hospital grade;double electric, personal  (spectra)   Equipment for Home Use breast pump provided;breast pump ordered through insurance   Breast Pumping   Breast Pumping Interventions frequent pumping encouraged  (encouraged to pump every 3 hours while baby is in NICU)     Courtesy visit for newly postpartum MOB with baby in NICU observation. RN thought baby may be admitted, but MOB thinks baby may come back to room. Hospital pump provided and instructed on use. NICU packet provided and briefly reviewed. MOB older children visiting. MOB reports breastfeeding 1st child for 3 months (had supply issues and supplemented from the start), and 2nd child for 6 weeks (too hard to pump after returning to work). 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 Encouraged frequent pumping while baby is in NICU. Encouraged to call as needs arise.

## 2023-11-28 NOTE — ANESTHESIA PROCEDURE NOTES
Spinal Block      Patient reassessed immediately prior to procedure    Indication:procedure for pain  Performed By  CRNA/CAA: Nara Mojica CRNA  Preanesthetic Checklist  Completed: patient identified, IV checked, risks and benefits discussed, surgical consent, monitors and equipment checked, pre-op evaluation and timeout performed  Spinal Block Prep:  Patient Position:sitting  Sterile Tech:cap, gloves, mask and sterile barriers  Prep:Betadine  Patient Monitoring:blood pressure monitoring, continuous pulse oximetry and EKG    Spinal Block Procedure  Approach:midline  Guidance:palpation technique  Location:L3-L4  Needle Type:Shirley  Needle Gauge:25 G  Placement of Spinal needle event:cerebrospinal fluid aspirated  Paresthesia: no  Fluid Appearance:clear     Post Assessment  Patient Tolerance:patient tolerated the procedure well with no apparent complications  Complications no

## 2023-11-28 NOTE — ANESTHESIA PREPROCEDURE EVALUATION
Anesthesia Evaluation     Patient summary reviewed and Nursing notes reviewed   NPO Solid Status: > 8 hours  NPO Liquid Status: > 8 hours           Airway   Dental      Pulmonary - negative pulmonary ROS   Cardiovascular - negative cardio ROS        Neuro/Psych- negative ROS  GI/Hepatic/Renal/Endo    (+) morbid obesity, diabetes mellitus type 2, thyroid problem     Musculoskeletal (-) negative ROS    Abdominal    Substance History - negative use     OB/GYN    (+) Pregnant        Other                    Anesthesia Plan    ASA 3     spinal and ITN       Anesthetic plan, risks, benefits, and alternatives have been provided, discussed and informed consent has been obtained with: patient.    CODE STATUS:    Level Of Support Discussed With: Patient  Code Status (Patient has no pulse and is not breathing): CPR (Attempt to Resuscitate)  Medical Interventions (Patient has pulse or is breathing): Full Support

## 2023-11-28 NOTE — ANESTHESIA POSTPROCEDURE EVALUATION
Patient: Sabrina Bass    Procedure Summary       Date: 23 Room / Location: [facility] LABOR DELIVERY   [facility] LABOR DELIVERY    Anesthesia Start: 1230 Anesthesia Stop: 1340    Procedure:  SECTION REPEAT (Abdomen) Diagnosis:     Surgeons: Chio Dumas MD Provider: Familia Felipe DO    Anesthesia Type: spinal, ITN ASA Status: 3            Anesthesia Type: spinal, ITN    Vitals  Vitals Value Taken Time   /67 23 1340   Temp 98.3 °F (36.8 °C) 23 1339   Pulse 103 23 1343   Resp 18 23 1339   SpO2 98 % 23 1343   Vitals shown include unfiled device data.        Post Anesthesia Care and Evaluation    Patient location during evaluation: bedside  Patient participation: complete - patient participated  Level of consciousness: awake and alert  Pain score: 0  Pain management: adequate    Airway patency: patent  Anesthetic complications: No anesthetic complications    Cardiovascular status: acceptable  Respiratory status: acceptable  Hydration status: acceptable

## 2023-11-28 NOTE — OP NOTE
[facility]   Section Operative Note    Pre-Operative Dx:   1.  IUP at 37w1d  weeks     2. Prior         Postoperative dx:    1.  Same     Procedure: Procedure(s):   SECTION REPEAT   Surgeon: Chio Dumas MD    Assistant: Surgeon(s):  Chio Dumas MD        Anesthesia: Spinal    EBL:   mls.  768  mls.         IV Fluids: 1100 mls.   UOP: 250 mls.       Antibiotics: cefazolin (Ancef)     Infant:            Gender: male  infant    Weight: 4104 g (9 lb 0.8 oz)     Apgars: 7  @ 1 minute /     9  @ 5 minutes    Fetal presentation: cephalic   Amniotic fluid: Clear      Complications:   None      Disposition:   Mother to Mother Baby/Postpartum  in stable condition currently.   Baby to NBN  in stable condition currently.       Procedure:   The patient was taken to the operating room where spinal anesthesia was placed, and she was placed in supine position with a leftward tilt. Sequential compression devices on bilateral lower extremities and a Cleveland catheter placed in her bladder. After being prepped and draped in a normal sterile fashion, a Pfannenstiel skin incision was made with a scalpel and taken down to the level of the fascia using the Bovie. The fascia was incised in the midline and extended laterally. The superior edge of the fascia was grasped with Kocher clamps, elevated and the rectus muscle dissected off. In a similar fashion, the inferior edge of the fascia was grasped with Kocher clamps, elevated and the rectus muscles dissected off. The rectus muscles were bisected in the midline. The peritoneum was identified, grasped and entered into sharply using Metzenbaum scissors. This incision was extended superiorly and inferiorly with good visualization of the bladder. Bladder blade was placed. A bladder flap was created. A low transverse uterine incision was made with a scalpel and extended laterally. Amniotomy with clear fluid occurred at the time of hysterotomy. The  head was brought to the uterine incision, and using fundal pressure, the head delivered without complication. Nose and mouth were bulb suctioned.  Shoulders and body were to follow. Cord was milked x4, clamped x2 and cut. Infant was handed to the awaiting pediatric team.  Cord blood was obtained. The placenta was manually extracted. The uterus was exteriorized and cleared of all clot and debris and the hysterotomy site was closed with a 1-0 chromic in a running locked fashion starting at the left angle and moving towards the right. Copious irrigation behind the uterus ensued. The uterus was returned to the abdominal cavity. Paracolic gutters were cleared of all clot and debris. The hysterotomy site was noted to be hemostatic as well as the bladder flap and Interceed was placed over this. The peritoneum was reapproximated using a 2-0 chromic in a running fashion starting superiorly and moving inferiorly. Irrigation over the rectus muscles then occurred with Bovie cauterization of any bleeding sites. The fascia was reapproximated using a looped 0 PDS in a running fashion starting at the left angle and moving towards the right.  The subcutaneous fat layer was hemostatic and closed in an interrupted fashion with 2-0 Plain.  The skin was reapproximated with staples. All sponge, lap, and needle counts were correct x2. The patient was taken to the recovery room in stable condition.             Chio Dumas MD  11/28/2023  14:50 EST

## 2023-11-28 NOTE — H&P
History and Physical:    Subjective     Chief Complaint   Patient presents with    Scheduled        Sabrina Bass is a 33 y.o. year old  with an Estimated Date of Delivery: 23 currently at 37w1d presenting with  suboptimally controlled type 2 diabetes in pregnancy.  Patient with history of  section x 2 desiring repeat.  Recommendation from  diagnostic center was delivery after 37 weeks. .    Prenatal care has been with Chio Dumas MD.  It has been significant for  type 2 diabetes,  x 2, excessive fetal growth. .      Review of Systems   Constitutional: Negative.    HENT: Negative.     Respiratory: Negative.     Cardiovascular: Negative.    Gastrointestinal: Negative.    Genitourinary: Negative.    Musculoskeletal: Negative.    Skin: Negative.    Allergic/Immunologic: Negative.    Neurological: Negative.    Hematological: Negative.    Psychiatric/Behavioral: Negative.             Past Medical History:   Diagnosis Date    Gestational diabetes     Thyromegaly 10/08/2018    REVIEWED HISTORICALLY REFERRED TO [facility], BUT PT STATES SHE NEVER GOT SENT A FORMAL REFERRRAL. LAB WORK NORMALIZED POST PREGNANCY    Varicella     When I was a child     Past Surgical History:   Procedure Laterality Date    ANAL FISSURECTOMY  2021     SECTION       SECTION N/A 2017    Procedure:  SECTION REPEAT;  Surgeon: Chio Dumas MD;  Location: [facility] LABOR DELIVERY;  Service:     COLONOSCOPY W/ BIOPSIES  2020    sessile rectal polyp removed, internal hemorrhoids    HEMORRHOIDECTOMY  2021    WISDOM TOOTH EXTRACTION       Family History   Problem Relation Age of Onset    Diabetes Father     Heart disease Mother         IRREGULAR HEART BEAT  LOW RESTING HEARTRATE (ADOPTED)    Epilepsy Sister     Colon polyps Sister     Other Paternal Grandmother         PRE DIABETES    Colon cancer Paternal Great-Grandfather      Social History  "    Tobacco Use    Smoking status: Never     Passive exposure: Never    Smokeless tobacco: Never   Vaping Use    Vaping Use: Never used   Substance Use Topics    Alcohol use: Not Currently    Drug use: Never     Medications Prior to Admission   Medication Sig Dispense Refill Last Dose    acetaminophen (TYLENOL) 500 MG tablet Take 1 tablet by mouth Every 6 (Six) Hours As Needed for Mild Pain.   2023 at 2300    Blood Glucose Monitoring Suppl (ONE TOUCH ULTRA 2) w/Device kit Take As Directed.   2023    Blood Glucose Monitoring Suppl device Using to test glucose. 1 each 0 2023    calcium carbonate (TUMS) 500 MG chewable tablet Chew 1 tablet As Needed for Indigestion or Heartburn.   2023 at 1020    glucose blood test strip Testing 4 times daily. 150 each 5 2023    Insulin NPH, Human,, Isophane, (HumuLIN N KwikPen) 100 UNIT/ML injection Inject 44u at night, titrate for max daily dose of 100u (Patient taking differently: 52 Units Every Night. Inject 44u at night, titrate for max daily dose of 100u) 15 mL 1 2023    Insulin Pen Needle 32G X 4 MM misc 1 each 2 (Two) Times a Day. 200 each 3 2023    Lancets 33G misc 1 each 4 (Four) Times a Day. 150 each 3 2023    Prenat-Fe Carbonyl-FA-Omega 3 (One-A-Day Womens Prenatal 1) 28-0.8-235 MG capsule    Past Month     Allergies:  Patient has no known allergies.  OB History    Para Term  AB Living   4 2 2 0 1 2   SAB IAB Ectopic Molar Multiple Live Births   1 0 0 0 0 2      # Outcome Date GA Lbr Marcus/2nd Weight Sex Delivery Anes PTL Lv   4 Current            3 SAB 2022 6w0d          2 Term 17 37w1d  4531 g (9 lb 15.8 oz) M CS-LTranv Spinal  STEPHANI   1 Term 13 39w0d  4593 g (10 lb 2 oz) F CS-LTranv Spinal N STEPHANI             Objective     Temp 98.4 °F (36.9 °C) (Oral)   Resp 18   Ht 162.6 cm (64\")   Wt 121 kg (266 lb 9.6 oz)   LMP 2023   BMI 45.76 kg/m²     Physical Exam    General:  No acute distress "   Lung: Clear to auscultation bilaterally, no wheezing, clear at bases   Heart: Regular rate and rhythm, no murmurs    Abdomen: Gravid, nontender   Extremities: 2+ DTR's bilaterally, no edema   FHT's: reactive    Cervix: Deferred   [place]: Contraction are none           Lab Review   External Prenatal Results       Pregnancy Outside Results - Transcribed From Office Records - See Scanned Records For Details       Test Value Date Time    ABO  O  11/27/23 1244    Rh  Positive  11/27/23 1244    Antibody Screen  Negative  11/27/23 1244       Negative  09/26/23 0935       Negative  05/10/23 1020    Varicella IgG       Rubella  1.33 index 05/10/23 1020    Hgb  11.6 g/dL 11/27/23 1244       12.7 g/dL 09/26/23 0935       14.4 g/dL 05/10/23 1020    Hct  34.4 % 11/27/23 1244       37.2 % 09/26/23 0935       42.4 % 05/10/23 1020    Glucose Fasting GTT  102 mg/dL 06/07/23 0900    Glucose Tolerance Test 1 hour  216 mg/dL 06/07/23 0900    Glucose Tolerance Test 3 hour  147 mg/dL 06/07/23 0900    Gonorrhea (discrete)  Negative  05/10/23 1020    Chlamydia (discrete)  Negative  05/10/23 1020    RPR  Non Reactive  05/10/23 1020    VDRL       Syphilis Antibody       HBsAg  Negative  05/10/23 1020    Herpes Simplex Virus PCR       Herpes Simplex VIrus Culture       HIV  Non Reactive  05/10/23 1020    Hep C RNA Quant PCR       Hep C Antibody  Non Reactive  05/10/23 1020    AFP       Group B Strep  No Group B Streptococcus isolated  11/20/23 1820    GBS Susceptibility to Clindamycin       GBS Susceptibility to Erythromycin       Fetal Fibronectin       Genetic Testing, Maternal Blood                 Drug Screening       Test Value Date Time    Urine Drug Screen       Amphetamine Screen  Negative  03/26/17 2347    Barbiturate Screen  Negative  03/26/17 2347    Benzodiazepine Screen  Negative  03/26/17 2347    Methadone Screen  Negative  03/26/17 2347    Phencyclidine Screen  Negative  03/26/17 2347    Opiates Screen  Negative  03/26/17 2347     THC Screen  Negative  17    Cocaine Screen       Propoxyphene Screen  Negative  17    Buprenorphine Screen  Negative  17    Methamphetamine Screen       Oxycodone Screen  Negative  17    Tricyclic Antidepressants Screen  Negative  17              Legend    ^: Historical                                Lab Results   Component Value Date    ALKPHOS 59 11/10/2016    ALT 29 11/10/2016    AST 19 11/10/2016    CREATININE 0.79 2021    BILITOT 0.7 11/10/2016     Lab Results   Component Value Date    WBC 8.50 2023    HGB 11.6 (L) 2023    HCT 34.4 2023    MCV 93.5 2023     2023        Results for orders placed in visit on 10/24/23    US Ob Follow Up Transabdominal Approach    Narrative  PAT NAME: KELLI CHAPPELL  MED REC#: [id number]  BIRTH DA: 1990  PAT GEND: F  ACCOUNT#: [id number]  PAT TYPE: O  EXAM MARTÍN: <OBR.7.1>[id number]</OBR.7.1><OBR.7.1>[id number]</OBR.7.1>  REF PHYS AYLEEN FITZGERALD  ACCESSION [id number]      Indication  ========    growth/bpp; type 2 DM      Comparison Studies  =================    The findings of this study are compared to the prior ultrasound study dated 23      Method  =======    Voluson E6, Transabdominal ultrasound examination      Pregnancy  =========    Ayala pregnancy. Number of fetuses: 1      Dating  ======    LMP on: 3/5/2023  GA by LMP 33 w + 2 d  IVETH by LMP: 12/10/2023  Method of dating: based on stated IVETH  GA by prior assessment 32 w + 1 d  IVETH by prior assessment: 2023  Ultrasound examination on: 10/24/2023  GA by U/S based upon: AC, BPD, Femur, HC  GA by U/S 33 w + 6 d  IVETH by U/S: 2023  Previous dating: based on stated IVETH, selected on 2023  Agreed IVETH of previous datin2023  Assigned: based on stated IVETH, selected on 10/24/2023  Assigned GA 32 w + 1 d  Assigned IVETH: 2023  Pregnancy length 280 d      General  Evaluation  ================    Cardiac activity present.  bpm.  Fetal movements visualized.  Presentation cephalic.  Placenta Placental site: posterior.  Amniotic fluid Amount of AF: normal. MVP 4.6 cm. JIMBO 12.7 cm. Q1 2.2 cm, Q2 3.0 cm, Q3 4.6 cm, Q4 2.9 cm.      Biophysical Profile  ===============    0: Fetal breathing movements  2: Gross body movements  2: Fetal tone  2: Amniotic fluid volume  6/8 Biophysical profile score      Fetal Biometry  ============    Standard  BPD 84.0 mm  33w 6d        86%        Hadlock    .4 mm  34w 1d        67%        Hadlock    .1 mm  33w 3d        85%        Hadlock    Femur 65.5 mm  33w 5d        80%        Hadlock    HC / AC 1.04    EFW 2,248 g  69%        J Luis    EFW (lb) 4 lb  EFW (oz) 15 oz  EFW by: Hadlock (BPD-HC-AC-FL)  Other: An ultrasound for fetal weight has a margin of error of up to twenty percent.  Extremities / Bony Struc  FL / BPD 0.78    FL / HC 0.21    FL / AC 0.22    Other Structures   bpm      Fetal Anatomy  ============    4-chamber view: Appears normal  Heart / Thorax  Diaphragm: Appears normal  Stomach: Appears normal  Kidneys: Appears normal  Bladder: Appears normal  Gender: male  Wants to know gender: yes      Impression  ==========    Male fetus in cephalic presentation fetal heart rate of 133. Posterior placenta and normal fluid volume noted. A BPP of 6 out of 8. Estimated fetal weight 4 pounds 15  ounces which is 69th percentile.      Recommendation  ===============    Follow-up as clinically indicated.      Indication  ========    growth/bpp; type 2 DM      Comparison Studies  =================    The findings of this study are compared to the prior ultrasound study dated 09/01/23      Method  =======    Voluson E6, Transabdominal ultrasound examination      Pregnancy  =========    Ayala pregnancy. Number of fetuses: 1      Dating  ======    LMP on: 3/5/2023  GA by LMP 33 w + 2 d  IVETH by LMP: 12/10/2023  Method of  dating: based on stated IVETH  GA by prior assessment 32 w + 1 d  IVETH by prior assessment: 2023  Ultrasound examination on: 10/24/2023  GA by U/S based upon: AC, BPD, Femur, HC  GA by U/S 33 w + 6 d  IVETH by U/S: 2023  Previous dating: based on stated IVETH, selected on 2023  Agreed IVETH of previous datin2023  Assigned: based on stated IVETH, selected on 10/24/2023  Assigned GA 32 w + 1 d  Assigned IVETH: 2023  Pregnancy length 280 d      General Evaluation  ================    Cardiac activity present.  bpm.  Fetal movements visualized.  Presentation cephalic.  Placenta Placental site: posterior.  Amniotic fluid Amount of AF: normal. MVP 4.6 cm. JIMBO 12.7 cm. Q1 2.2 cm, Q2 3.0 cm, Q3 4.6 cm, Q4 2.9 cm.      Biophysical Profile  ===============    0: Fetal breathing movements  2: Gross body movements  2: Fetal tone  2: Amniotic fluid volume   Biophysical profile score      Fetal Biometry  ============    Standard  BPD 84.0 mm  33w 6d        86%        Hadlock    .4 mm  34w 1d        67%        Hadlock    .1 mm  33w 3d        85%        Hadlock    Femur 65.5 mm  33w 5d        80%        Hadlock    HC / AC 1.04    EFW 2,248 g  69%        J Luis    EFW (lb) 4 lb  EFW (oz) 15 oz  EFW by: Hadlock (BPD-HC-AC-FL)  Other: An ultrasound for fetal weight has a margin of error of up to twenty percent.  Extremities / Bony Struc  FL / BPD 0.78    FL / HC 0.21    FL / AC 0.22    Other Structures   bpm      Fetal Anatomy  ============    4-chamber view: Appears normal  Heart / Thorax  Diaphragm: Appears normal  Stomach: Appears normal  Kidneys: Appears normal  Bladder: Appears normal  Gender: male  Wants to know gender: yes      Impression  ==========    Male fetus in cephalic presentation fetal heart rate of 133. Posterior placenta and normal fluid volume noted. A BPP of 6 out of 8. Estimated fetal weight 4 pounds 15  ounces which is 69th  percentile.      Recommendation  ===============    Follow-up as clinically indicated.        Sonographer: Raine Hurley, RT R, [facility]  Physician: Chio Dumas MD, FACOG    Electronically signed by: Chio Dumas MD, FACOG at: 2023/10/24 09:10              Patient Active Problem List    Diagnosis Date Noted    *History of  section 05/10/2023     Note Last Updated: 11/13/2023     X2.  Desires repeat    Repeat scheduled on 2023 at 12 -orders in      Third trimester pregnancy 2023    Excessive fetal growth affecting management of mother, antepartum 2023     Note Last Updated: 2023     US 2023: JIMBO 8cm, AC > 99%, EFW 96%. Patient reports having large babies. Repeat c/s scheduled on 2023. Large for gestation age education included in patient instructions. Encouraged to decrease carb and sugar intake. Reviewed FSBS goals: fasting <90, 2hr PP < 120.      Pregnancy complicated by pre-existing type 2 diabetes in third trimester 06/15/2023     Note Last Updated: 2023     At 32 weeks - 4 pounds 15 ounces which is 69th percentile.  Of note AC was 85th percentile.    Recommend repeat at 37 weeks.  -Order in  Recommend twice weekly monitoring  2023-discussed suboptimal control of diabetes with Dr. Christie.  She recommended delivery between 37 and 38 weeks.      Obesity in pregnancy, antepartum 05/10/2023     Note Last Updated: 2023     Hemoglobin A1c at new OB 6.2  Early Glucola 149-needs 3-hour testing.  Recommend baby aspirin weeks 12 through 36  Growth ultrasounds at 32 and 37 weeks  Recommend carbohydrate control, daily walking, water intake.    Anatomy at 20/2. 3VC, Normal AF amount, EFW 14oz. AC 85%. CL 68mm. Anatomy seen appears normal but is incomplete, will f/u 4 wks.       Prenatal care, antepartum 05/10/2023    Thyromegaly 05/10/2023     Note Last Updated: 5/15/2023     TSH decreased.  Recommend endocrine follow-up.      History of anal fissures  2021     Note Last Updated: 2021     And hemorrhoidectomy with repair on 2021          Assessment & Plan     ASSESSMENT  IUP at 37w1d  History of  x 2  Suboptimally controlled type 2 diabetes and scheduled  section   3. GBBS negative    PLAN  Admit to labor and delivery   2.   Proceed with repeat  section         Chio Dumas MD  2023@

## 2023-11-29 LAB
BASOPHILS # BLD AUTO: 0.03 10*3/MM3 (ref 0–0.2)
BASOPHILS NFR BLD AUTO: 0.3 % (ref 0–1.5)
DEPRECATED RDW RBC AUTO: 47.7 FL (ref 37–54)
EOSINOPHIL # BLD AUTO: 0.07 10*3/MM3 (ref 0–0.4)
EOSINOPHIL NFR BLD AUTO: 0.8 % (ref 0.3–6.2)
ERYTHROCYTE [DISTWIDTH] IN BLOOD BY AUTOMATED COUNT: 13.7 % (ref 12.3–15.4)
GLUCOSE BLDC GLUCOMTR-MCNC: 106 MG/DL (ref 70–130)
GLUCOSE BLDC GLUCOMTR-MCNC: 130 MG/DL (ref 70–130)
GLUCOSE BLDC GLUCOMTR-MCNC: 143 MG/DL (ref 70–130)
GLUCOSE BLDC GLUCOMTR-MCNC: 98 MG/DL (ref 70–130)
HCT VFR BLD AUTO: 29 % (ref 34–46.6)
HGB BLD-MCNC: 9.8 G/DL (ref 12–15.9)
IMM GRANULOCYTES # BLD AUTO: 0.07 10*3/MM3 (ref 0–0.05)
IMM GRANULOCYTES NFR BLD AUTO: 0.8 % (ref 0–0.5)
LYMPHOCYTES # BLD AUTO: 1.49 10*3/MM3 (ref 0.7–3.1)
LYMPHOCYTES NFR BLD AUTO: 16.8 % (ref 19.6–45.3)
MCH RBC QN AUTO: 32.2 PG (ref 26.6–33)
MCHC RBC AUTO-ENTMCNC: 33.8 G/DL (ref 31.5–35.7)
MCV RBC AUTO: 95.4 FL (ref 79–97)
MONOCYTES # BLD AUTO: 0.76 10*3/MM3 (ref 0.1–0.9)
MONOCYTES NFR BLD AUTO: 8.6 % (ref 5–12)
NEUTROPHILS NFR BLD AUTO: 6.46 10*3/MM3 (ref 1.7–7)
NEUTROPHILS NFR BLD AUTO: 72.7 % (ref 42.7–76)
NRBC BLD AUTO-RTO: 0 /100 WBC (ref 0–0.2)
PLATELET # BLD AUTO: 117 10*3/MM3 (ref 140–450)
PMV BLD AUTO: 11.9 FL (ref 6–12)
RBC # BLD AUTO: 3.04 10*6/MM3 (ref 3.77–5.28)
WBC NRBC COR # BLD AUTO: 8.88 10*3/MM3 (ref 3.4–10.8)

## 2023-11-29 PROCEDURE — 25010000002 KETOROLAC TROMETHAMINE PER 15 MG: Performed by: OBSTETRICS & GYNECOLOGY

## 2023-11-29 PROCEDURE — 0503F POSTPARTUM CARE VISIT: CPT | Performed by: ADVANCED PRACTICE MIDWIFE

## 2023-11-29 PROCEDURE — 85025 COMPLETE CBC W/AUTO DIFF WBC: CPT | Performed by: OBSTETRICS & GYNECOLOGY

## 2023-11-29 PROCEDURE — 82948 REAGENT STRIP/BLOOD GLUCOSE: CPT

## 2023-11-29 RX ADMIN — ACETAMINOPHEN 650 MG: 325 TABLET ORAL at 18:10

## 2023-11-29 RX ADMIN — PRENATAL VITAMINS-IRON FUMARATE 27 MG IRON-FOLIC ACID 0.8 MG TABLET 1 TABLET: at 08:39

## 2023-11-29 RX ADMIN — KETOROLAC TROMETHAMINE 15 MG: 15 INJECTION, SOLUTION INTRAMUSCULAR; INTRAVENOUS at 14:03

## 2023-11-29 RX ADMIN — KETOROLAC TROMETHAMINE 15 MG: 15 INJECTION, SOLUTION INTRAMUSCULAR; INTRAVENOUS at 02:51

## 2023-11-29 RX ADMIN — OXYCODONE HYDROCHLORIDE 5 MG: 5 TABLET ORAL at 06:45

## 2023-11-29 RX ADMIN — DOCUSATE SODIUM 100 MG: 100 CAPSULE, LIQUID FILLED ORAL at 08:39

## 2023-11-29 RX ADMIN — KETOROLAC TROMETHAMINE 15 MG: 15 INJECTION, SOLUTION INTRAMUSCULAR; INTRAVENOUS at 08:39

## 2023-11-29 RX ADMIN — IBUPROFEN 600 MG: 600 TABLET, FILM COATED ORAL at 20:36

## 2023-11-29 RX ADMIN — ACETAMINOPHEN 1000 MG: 500 TABLET, FILM COATED ORAL at 06:47

## 2023-11-29 RX ADMIN — SIMETHICONE 80 MG: 80 TABLET, CHEWABLE ORAL at 02:52

## 2023-11-29 RX ADMIN — SIMETHICONE 80 MG: 80 TABLET, CHEWABLE ORAL at 18:51

## 2023-11-29 RX ADMIN — Medication 1 APPLICATION: at 22:02

## 2023-11-29 RX ADMIN — OXYCODONE HYDROCHLORIDE 5 MG: 5 TABLET ORAL at 11:39

## 2023-11-29 RX ADMIN — ACETAMINOPHEN 1000 MG: 500 TABLET, FILM COATED ORAL at 11:39

## 2023-11-29 RX ADMIN — OXYCODONE HYDROCHLORIDE 5 MG: 5 TABLET ORAL at 15:16

## 2023-11-29 RX ADMIN — ACETAMINOPHEN 1000 MG: 500 TABLET, FILM COATED ORAL at 00:01

## 2023-11-29 RX ADMIN — OXYCODONE HYDROCHLORIDE 10 MG: 10 TABLET ORAL at 18:51

## 2023-11-29 RX ADMIN — SIMETHICONE 80 MG: 80 TABLET, CHEWABLE ORAL at 08:39

## 2023-11-29 NOTE — PROGRESS NOTES
Postpartum Progress Note    Patient name: Sabrina Bass  YOB: 1990   MRN: [id number]  Referring Provider: Chio Dumas MD  Admission Date: 2023  Date of Service: 2023    ID: 33 y.o.     Diagnosis:   S/p  delivery 1 Day Post-Op     History of  section    Delivery of pregnancy by  section       Subjective:      No complaints.  Moderate lochia.  Ambulating, voiding, tolerating diet.  Pain well controlled.  The patient is currently breastfeeding, working with lactation.   This baby is a Boy and they do not desire circumcision.    Objective:      Vital signs:  Vital Signs Range for the last 24 hours  Temperature: Temp:  [97.8 °F (36.6 °C)-98.6 °F (37 °C)] 97.8 °F (36.6 °C)   Temp Source: Temp src: Oral   BP: BP: (100-131)/(46-76) 108/59   Pulse: Heart Rate:  [60-89] 75   Respirations: Resp:  [16-18] 16   Weight: 121 kg (266 lb 9.6 oz)     General: Alert & oriented x4, in no apparent distress  Abdomen: soft, nontender  Uterus: firm, nontender  Incision: clean, dry, intact, dressing clean  Extremities: nontender; no edema      Labs:  Lab Results   Component Value Date    WBC 8.88 2023    HGB 9.8 (L) 2023    HCT 29.0 (L) 2023    MCV 95.4 2023     (L) 2023     Results from last 7 days   Lab Units 23  1244   ABO TYPING  O   RH TYPING  Positive     External Prenatal Results       Pregnancy Outside Results - Transcribed From Office Records - See Scanned Records For Details       Test Value Date Time    ABO  O  23 1244    Rh  Positive  23 1244    Antibody Screen  Negative  23 1244       Negative  23 0935       Negative  05/10/23 1020    Varicella IgG       Rubella  1.33 index 05/10/23 1020    Hgb  9.8 g/dL 23 0502       11.6 g/dL 23 1244       12.7 g/dL 23 0935       14.4 g/dL 05/10/23 1020    Hct  29.0 % 23 0502       34.4 % 23 1244       37.2 % 23  0935       42.4 % 05/10/23 1020    Glucose Fasting GTT  102 mg/dL 23 0900    Glucose Tolerance Test 1 hour  216 mg/dL 23 0900    Glucose Tolerance Test 3 hour  147 mg/dL 23 0900    Gonorrhea (discrete)  Negative  05/10/23 1020    Chlamydia (discrete)  Negative  05/10/23 1020    RPR  Non Reactive  05/10/23 1020    VDRL       Syphilis Antibody       HBsAg  Negative  05/10/23 1020    Herpes Simplex Virus PCR       Herpes Simplex VIrus Culture       HIV  Non Reactive  05/10/23 1020    Hep C RNA Quant PCR       Hep C Antibody  Non Reactive  05/10/23 1020    AFP       Group B Strep  No Group B Streptococcus isolated  23 1820    GBS Susceptibility to Clindamycin       GBS Susceptibility to Erythromycin       Fetal Fibronectin       Genetic Testing, Maternal Blood                 Drug Screening       Test Value Date Time    Urine Drug Screen       Amphetamine Screen  Negative  23 1118    Barbiturate Screen  Negative  23 1118    Benzodiazepine Screen  Negative  23 1118    Methadone Screen  Negative  23 1118    Phencyclidine Screen  Negative  23 1118    Opiates Screen  Negative  23 1118    THC Screen  Negative  23 1118    Cocaine Screen       Propoxyphene Screen  Negative  17 2347    Buprenorphine Screen  Negative  23 1118    Methamphetamine Screen       Oxycodone Screen  Negative  23 1118    Tricyclic Antidepressants Screen  Negative  23 1118              Legend    ^: Historical                            Assessment/Plan:      1 Day Post-Op s/p Procedure(s):   SECTION REPEAT  1. S/p  delivery: Continue postoperative care.  Doing well.  2. Infant feeding: Supportive care.  The patient is currently breastfeeding.  3. Asymptomatic iron deficiency anemia: Continue PNV.

## 2023-11-29 NOTE — ANESTHESIA POSTPROCEDURE EVALUATION
Patient: Sabrina Bass    Procedure Summary       Date: 23 Room / Location: [facility] LABOR DELIVERY   [facility] LABOR DELIVERY    Anesthesia Start: 1230 Anesthesia Stop: 1340    Procedure:  SECTION REPEAT (Abdomen) Diagnosis:     Surgeons: Chio Dumas MD Provider: Familia Felipe DO    Anesthesia Type: spinal, ITN ASA Status: 3            Anesthesia Type: spinal, ITN    Vitals  Vitals Value Taken Time   /58 23 0700   Temp 98.2 °F (36.8 °C) 23 0700   Pulse 83 23 0700   Resp 16 23 0700   SpO2 98 % 23 1540           Post Anesthesia Care and Evaluation    Patient location during evaluation: bedside  Patient participation: complete - patient participated  Level of consciousness: awake and alert  Pain management: adequate    Airway patency: patent  Anesthetic complications: No anesthetic complications    Cardiovascular status: acceptable  Respiratory status: acceptable  Hydration status: acceptable  Post Neuraxial Block status: Motor and sensory function returned to baseline and No signs or symptoms of PDPH

## 2023-11-29 NOTE — LACTATION NOTE
11/29/23 1520   Maternal Information   Date of Referral 11/29/23   Person Making Referral nurse   Infant Reason for Referral hypoglycemia   Maternal Assessment   Breast Size Issue none   Breast Shape Bilateral:;round   Breast Density Bilateral:;soft   Nipples Bilateral:;everted   Left Nipple Symptoms intact;nontender   Right Nipple Symptoms intact;nontender   Maternal Infant Feeding   Maternal Emotional State tense   Infant Positioning cross-cradle;cradle  (LCC, RC)   Signs of Milk Transfer other (see comments)  (few suckles at breast; upon suckle assessment, very weak suckle noted)   Pain with Feeding no  (per pt report)   Comfort Measures Before/During Feeding infant position adjusted;latch adjusted;maternal position adjusted;suction broken using finger   Milk Ejection Reflex other (see comments)  (hand expression demonstrated)   Latch Assistance full assistance needed   Support Person Involvement invited to assist with feeding   Additional Documentation Breastfeeding Supplementation (Group)   Breastfeeding Supplementation   Person Feeding Infant lactation consultant   Infant Indication for Supplementation hypoglycemia;ineffective breastfeeding   Breastfeeding Supplementation Type formula  (15ml given, per peds order)   Method of Supplementation paced bottle  (education demonstrated)   Formula Supplementation Type 22 calorie formula   Nipple Used For Supplementation preemie  (Dr Walter bottle provided; bilateral chin and cheek support required for feeding; gagging and emesis present;)   Milk Expression/Equipment   Breast Pump Type double electric, hospital grade   Breast Pump Flange Type hard   Breast Pumping   Breast Pumping Interventions post-feed pumping encouraged  (for short/missed feedings, if supplementation is required, or if breastfeeding becomes too painful, to encourage breastmilk production)   Breast Pumping double electric breast pump utilized   Lactation Referrals   Lactation Referrals other (see  comments)  (SLP consult requested; difficult to bottle feed)

## 2023-11-30 PROBLEM — E11.9 TYPE 2 DIABETES MELLITUS WITHOUT COMPLICATION, WITHOUT LONG-TERM CURRENT USE OF INSULIN: Status: ACTIVE | Noted: 2023-11-30

## 2023-11-30 LAB
GLUCOSE BLDC GLUCOMTR-MCNC: 105 MG/DL (ref 70–130)
GLUCOSE BLDC GLUCOMTR-MCNC: 117 MG/DL (ref 70–130)
GLUCOSE BLDC GLUCOMTR-MCNC: 136 MG/DL (ref 70–130)
GLUCOSE BLDC GLUCOMTR-MCNC: 143 MG/DL (ref 70–130)

## 2023-11-30 PROCEDURE — 82948 REAGENT STRIP/BLOOD GLUCOSE: CPT

## 2023-11-30 RX ADMIN — IBUPROFEN 600 MG: 600 TABLET, FILM COATED ORAL at 08:44

## 2023-11-30 RX ADMIN — OXYCODONE HYDROCHLORIDE 10 MG: 10 TABLET ORAL at 08:43

## 2023-11-30 RX ADMIN — DOCUSATE SODIUM 100 MG: 100 CAPSULE, LIQUID FILLED ORAL at 08:44

## 2023-11-30 RX ADMIN — SIMETHICONE 80 MG: 80 TABLET, CHEWABLE ORAL at 08:44

## 2023-11-30 RX ADMIN — OXYCODONE HYDROCHLORIDE 10 MG: 10 TABLET ORAL at 16:44

## 2023-11-30 RX ADMIN — IBUPROFEN 600 MG: 600 TABLET, FILM COATED ORAL at 02:56

## 2023-11-30 RX ADMIN — DOCUSATE SODIUM 100 MG: 100 CAPSULE, LIQUID FILLED ORAL at 20:40

## 2023-11-30 RX ADMIN — SIMETHICONE 80 MG: 80 TABLET, CHEWABLE ORAL at 20:40

## 2023-11-30 RX ADMIN — OXYCODONE HYDROCHLORIDE 10 MG: 10 TABLET ORAL at 04:08

## 2023-11-30 RX ADMIN — OXYCODONE HYDROCHLORIDE 10 MG: 10 TABLET ORAL at 00:25

## 2023-11-30 RX ADMIN — OXYCODONE HYDROCHLORIDE 10 MG: 10 TABLET ORAL at 20:40

## 2023-11-30 RX ADMIN — IBUPROFEN 600 MG: 600 TABLET, FILM COATED ORAL at 14:16

## 2023-11-30 RX ADMIN — ACETAMINOPHEN 650 MG: 325 TABLET ORAL at 12:23

## 2023-11-30 RX ADMIN — ACETAMINOPHEN 650 MG: 325 TABLET ORAL at 06:44

## 2023-11-30 RX ADMIN — ACETAMINOPHEN 650 MG: 325 TABLET ORAL at 18:19

## 2023-11-30 RX ADMIN — PRENATAL VITAMINS-IRON FUMARATE 27 MG IRON-FOLIC ACID 0.8 MG TABLET 1 TABLET: at 08:44

## 2023-11-30 RX ADMIN — IBUPROFEN 600 MG: 600 TABLET, FILM COATED ORAL at 20:40

## 2023-11-30 RX ADMIN — OXYCODONE HYDROCHLORIDE 10 MG: 10 TABLET ORAL at 12:26

## 2023-11-30 RX ADMIN — ACETAMINOPHEN 650 MG: 325 TABLET ORAL at 00:25

## 2023-11-30 NOTE — LACTATION NOTE
Courtesy visit with mother; complaints of infant having a weak suck; at time of LC visit, father of baby was feeding the infant NeoSure 22 and using paced bottle feeding; assessed baby's suction on bottle, showed dad how to flare lips to get a nice suction on the nipple; talked to parents about suction training using a pacifier; mother pumping at the time of visit with hospital pump; assessed nipples and flange size, 24 flange size looked great; mother stated she was using a 27 flange size and was not getting anything; using the 24 flange size mother has visible colostrum on the left breast; provided syringes; SLP will see today; encouraged mother to continue with baby first nursing, supplementing, then pumping for 15 minutes; encouraged skin to skin, and to call lactation as needed or had questions concerns or needed a latch check.

## 2023-11-30 NOTE — PROGRESS NOTES
Postpartum Progress Note    Patient name: Sabrina Bass  YOB: 1990   MRN: [id number]  Referring Provider: Chio Dumas MD  Admission Date: 2023  Date of Service: 2023    ID: 33 y.o.     Diagnosis:   S/p  delivery 2 Days Post-Op     History of  section    Delivery of pregnancy by  section    Type 2 diabetes mellitus without complication, without long-term current use of insulin       Subjective:      No complaints.  Moderate lochia.  Ambulating, voiding, tolerating diet.  Pain well controlled.  The patient is currently breastfeeding.   This baby is a male and do not desire circumcision.    Objective:      Vital signs:  Vital Signs Range for the last 24 hours  Temperature: Temp:  [97.5 °F (36.4 °C)-98.5 °F (36.9 °C)] 98.2 °F (36.8 °C)   Temp Source: Temp src: Oral   BP: BP: (104-141)/(54-74) 124/74   Pulse: Heart Rate:  [65-77] 70   Respirations: Resp:  [16-18] 18   Weight: 121 kg (266 lb 9.6 oz)     General: Alert & oriented x4, in no apparent distress  Abdomen: soft, nontender  Uterus: firm, nontender  Incision: clean, dry, intact, dressing clean,   Extremities: nontender; no edema      Labs:  Lab Results   Component Value Date    WBC 8.88 2023    HGB 9.8 (L) 2023    HCT 29.0 (L) 2023    MCV 95.4 2023     (L) 2023     Results from last 7 days   Lab Units 23  1244   ABO TYPING  O   RH TYPING  Positive     External Prenatal Results       Pregnancy Outside Results - Transcribed From Office Records - See Scanned Records For Details       Test Value Date Time    ABO  O  23 1244    Rh  Positive  23 1244    Antibody Screen  Negative  23 1244       Negative  23 0935       Negative  05/10/23 1020    Varicella IgG       Rubella  1.33 index 05/10/23 1020    Hgb  9.8 g/dL 23 0502       11.6 g/dL 23 1244       12.7 g/dL 23 0935       14.4 g/dL 05/10/23 1020    Hct   29.0 % 23 0502       34.4 % 23 1244       37.2 % 23 0935       42.4 % 05/10/23 1020    Glucose Fasting GTT  102 mg/dL 23 0900    Glucose Tolerance Test 1 hour  216 mg/dL 23 0900    Glucose Tolerance Test 3 hour  147 mg/dL 23 0900    Gonorrhea (discrete)  Negative  05/10/23 1020    Chlamydia (discrete)  Negative  05/10/23 1020    RPR  Non Reactive  05/10/23 1020    VDRL       Syphilis Antibody       HBsAg  Negative  05/10/23 1020    Herpes Simplex Virus PCR       Herpes Simplex VIrus Culture       HIV  Non Reactive  05/10/23 1020    Hep C RNA Quant PCR       Hep C Antibody  Non Reactive  05/10/23 1020    AFP       Group B Strep  No Group B Streptococcus isolated  23 1820    GBS Susceptibility to Clindamycin       GBS Susceptibility to Erythromycin       Fetal Fibronectin       Genetic Testing, Maternal Blood                 Drug Screening       Test Value Date Time    Urine Drug Screen       Amphetamine Screen  Negative  23 1118    Barbiturate Screen  Negative  23 1118    Benzodiazepine Screen  Negative  23 1118    Methadone Screen  Negative  23 1118    Phencyclidine Screen  Negative  23 1118    Opiates Screen  Negative  23 1118    THC Screen  Negative  23 1118    Cocaine Screen       Propoxyphene Screen  Negative  17 2347    Buprenorphine Screen  Negative  23 1118    Methamphetamine Screen       Oxycodone Screen  Negative  23 1118    Tricyclic Antidepressants Screen  Negative  23 1118              Legend    ^: Historical                            Assessment/Plan:      2 Days Post-Op s/p Procedure(s):   SECTION REPEAT  1. S/p  delivery: Continue postoperative care.  Doing well.  2. Infant feeding: Supportive care.  The patient is currently breastfeeding.  3. Type 2 diabetes: continue blood sugar monitoring and will need follow up with endocrine postpartum.

## 2023-12-01 VITALS
TEMPERATURE: 98.3 F | WEIGHT: 266.6 LBS | HEIGHT: 64 IN | RESPIRATION RATE: 16 BRPM | BODY MASS INDEX: 45.52 KG/M2 | DIASTOLIC BLOOD PRESSURE: 57 MMHG | OXYGEN SATURATION: 98 % | SYSTOLIC BLOOD PRESSURE: 114 MMHG | HEART RATE: 69 BPM

## 2023-12-01 LAB — GLUCOSE BLDC GLUCOMTR-MCNC: 100 MG/DL (ref 70–130)

## 2023-12-01 PROCEDURE — 82948 REAGENT STRIP/BLOOD GLUCOSE: CPT

## 2023-12-01 RX ORDER — OXYCODONE HYDROCHLORIDE 5 MG/1
5 TABLET ORAL EVERY 6 HOURS PRN
Qty: 12 TABLET | Refills: 0 | Status: SHIPPED | OUTPATIENT
Start: 2023-12-01 | End: 2023-12-04

## 2023-12-01 RX ORDER — DIAPER,BRIEF,INFANT-TODD,DISP
1 EACH MISCELLANEOUS 2 TIMES DAILY
Qty: 28.4 G | Refills: 1 | Status: SHIPPED | OUTPATIENT
Start: 2023-12-01

## 2023-12-01 RX ORDER — IBUPROFEN 600 MG/1
600 TABLET ORAL EVERY 6 HOURS
Qty: 30 TABLET | Refills: 0 | Status: SHIPPED | OUTPATIENT
Start: 2023-12-01

## 2023-12-01 RX ADMIN — PRENATAL VITAMINS-IRON FUMARATE 27 MG IRON-FOLIC ACID 0.8 MG TABLET 1 TABLET: at 08:39

## 2023-12-01 RX ADMIN — ACETAMINOPHEN 650 MG: 325 TABLET ORAL at 00:06

## 2023-12-01 RX ADMIN — OXYCODONE HYDROCHLORIDE 10 MG: 10 TABLET ORAL at 10:32

## 2023-12-01 RX ADMIN — DOCUSATE SODIUM 100 MG: 100 CAPSULE, LIQUID FILLED ORAL at 08:39

## 2023-12-01 RX ADMIN — OXYCODONE HYDROCHLORIDE 10 MG: 10 TABLET ORAL at 04:59

## 2023-12-01 RX ADMIN — IBUPROFEN 600 MG: 600 TABLET, FILM COATED ORAL at 03:24

## 2023-12-01 RX ADMIN — ACETAMINOPHEN 650 MG: 325 TABLET ORAL at 06:06

## 2023-12-01 RX ADMIN — ACETAMINOPHEN 650 MG: 325 TABLET ORAL at 12:17

## 2023-12-01 RX ADMIN — OXYCODONE HYDROCHLORIDE 10 MG: 10 TABLET ORAL at 00:47

## 2023-12-01 RX ADMIN — IBUPROFEN 600 MG: 600 TABLET, FILM COATED ORAL at 08:39

## 2023-12-01 NOTE — DISCHARGE SUMMARY
Discharge Summary    Date of Admission: 2023  Date of Discharge:  2023      Patient: Sabrina Bass      MR#:[id number]    Delivery Provider: Chio Dumas       Presenting Problem/History of Present Illness  History of  section [Z98.891]       History of  section    Delivery of pregnancy by  section    Type 2 diabetes mellitus without complication, without long-term current use of insulin         Discharge Diagnosis:  repeat csection at 37w1d    Procedures:  , Low Transverse     2023    1:04 PM        Hospital Course  Patient is a 33 y.o. female  at 37w1d status post repeat csection without complication. Postpartum the patient did well. She remained afebrile, with vital signs stable. She was ready for discharge on postpartum day 2.     Infant:   male  fetus 4104 g (9 lb 0.8 oz)  with Apgar scores of 7 , 9  at five minutes.    Condition on Discharge:  Stable    Vital Signs  Temp:  [97.9 °F (36.6 °C)-98.3 °F (36.8 °C)] 98.3 °F (36.8 °C)  Heart Rate:  [69-80] 69  Resp:  [16-18] 16  BP: (114-127)/(57-74) 114/57    Lab Results   Component Value Date    WBC 8.88 2023    HGB 9.8 (L) 2023    HCT 29.0 (L) 2023    MCV 95.4 2023     (L) 2023       Discharge Disposition  Home or Self Care    Discharge Medications     Discharge Medications        New Medications        Instructions Start Date   ibuprofen 600 MG tablet  Commonly known as: ADVIL,MOTRIN   600 mg, Oral, Every 6 Hours      oxyCODONE 5 MG immediate release tablet  Commonly known as: ROXICODONE   5 mg, Oral, Every 6 Hours PRN             Changes to Medications        Instructions Start Date   HumuLIN N KwikPen 100 UNIT/ML injection  Generic drug: Insulin NPH (Human) (Isophane)  What changed:   how much to take  when to take this   Inject 44u at night, titrate for max daily dose of 100u             Continue These Medications        Instructions Start Date    acetaminophen 500 MG tablet  Commonly known as: TYLENOL   500 mg, Oral, Every 6 Hours PRN      Blood Glucose Monitoring Suppl device   Using to test glucose.      ONE TOUCH ULTRA 2 w/Device kit   Take As Directed      calcium carbonate 500 MG chewable tablet  Commonly known as: TUMS   1 tablet, Oral, As Needed      glucose blood test strip   Testing 4 times daily.      Insulin Pen Needle 32G X 4 MM misc   1 each, Does not apply, 2 Times Daily      Lancets 33G misc   1 each, Does not apply, 4 Times Daily      One-A-Day Womens Prenatal 1 28-0.8-235 MG capsule   No dose, route, or frequency recorded.                   Follow-up Appointments  Future Appointments   Date Time Provider Department Center   12/12/2023  1:45 PM Gay Campoverde APRN [facility]   1/8/2024  1:10 PM Chio Dumas MD [facility]   3/1/2024 11:30 AM Rosenstein, Kyle S, MD [facility]     Additional Instructions for the Follow-ups that You Need to Schedule       Discharge Follow-up with Specified Provider: jone; 2 Weeks   As directed      To: jone   Follow Up: 2 Weeks                MANUELITO Alvarez  12/01/23  07:48 EST  [facility]

## 2023-12-04 ENCOUNTER — TELEPHONE (OUTPATIENT)
Dept: OBSTETRICS AND GYNECOLOGY | Facility: CLINIC | Age: 33
End: 2023-12-04
Payer: COMMERCIAL

## 2023-12-04 NOTE — TELEPHONE ENCOUNTER
Returned patient's call. Had [facility] 11/28/23. States during the day she is able to manage her pain with Motrin and Tylenol but in the evening the cramping becomes much worse. She is breastfeeding. She is requesting a refill of pain medication. Denies any fever, excessive bleeding, passing large clots, drainage from or redness around her incision. States she does have a rash from the dressing adhesive that has been treated. Advised her that they do not usually refill narcotic Rx. Recommended she try Motrin every 6 hour with Tylenol in between, warm shower, and heating pad on low to her back. Call if symptoms worsen. She v/u and agreed.

## 2023-12-12 ENCOUNTER — POSTPARTUM VISIT (OUTPATIENT)
Dept: OBSTETRICS AND GYNECOLOGY | Facility: CLINIC | Age: 33
End: 2023-12-12
Payer: COMMERCIAL

## 2023-12-12 VITALS
WEIGHT: 240.4 LBS | DIASTOLIC BLOOD PRESSURE: 80 MMHG | BODY MASS INDEX: 41.04 KG/M2 | HEIGHT: 64 IN | SYSTOLIC BLOOD PRESSURE: 124 MMHG

## 2023-12-12 DIAGNOSIS — Z98.891 S/P C-SECTION: Primary | ICD-10-CM

## 2023-12-12 NOTE — PROGRESS NOTES
Chief Complaint   Patient presents with    Postpartum Care     2 week incision check       Postpartum Visit         Sabrina Bass is a 33 y.o.  who presents today for a 2 week(s) postpartum check.        , Low Transverse    Information for the patient's :  George Bass [[id number]]   2023   male   George Bass   4104 g (9 lb 0.8 oz)   Gestational Age: 37w1d    Baby Discharged with Mom  Delivering MD: Chio Dumas MD.    Pregnancy complications: gestational DM (medication controlled)    Patient reports her incision is clean, dry, intact, but very painful. Patient describes vaginal bleeding as light.  She is breast and bottle feeding. Patient denies bowel or bladder issues. She states she was doing pretty good until yesterday when she lifted he abdomen up too far and she felt stabbing pains in her incision. She has been hurting since this.    She desires contraceptive methods: NuvaRing vaginal inserts for contraception when it's time after postpartum period.    Patient reports postpartum depression. Postpartum Depression Screening Questionnaire: 16, possibly treatment indicated.      The additional following portions of the patient's history were reviewed and updated as appropriate: allergies, current medications, past family history, past medical history, past social history, past surgical history, and problem list.      Review of Systems   Constitutional: Negative.    HENT: Negative.     Eyes: Negative.    Respiratory: Negative.     Cardiovascular: Negative.    Gastrointestinal: Negative.    Endocrine: Negative.    Genitourinary: Negative.    Musculoskeletal: Negative.    Skin: Negative.    Allergic/Immunologic: Negative.    Neurological: Negative.    Hematological: Negative.    Psychiatric/Behavioral:  Positive for depressed mood. The patient is nervous/anxious.        I have reviewed and agree with the HPI, ROS, and historical information as  "entered above. Gay Kassy Campoverde, APRN      Objective   /80   Ht 162.6 cm (64\")   Wt 109 kg (240 lb 6.4 oz)   LMP 2023   Breastfeeding Yes   BMI 41.26 kg/m²     Physical Exam  Vitals and nursing note reviewed.   Constitutional:       General: She is not in acute distress.     Appearance: Normal appearance. She is not ill-appearing.   Pulmonary:      Effort: Pulmonary effort is normal. No respiratory distress.   Abdominal:      General: There is no distension.      Palpations: Abdomen is soft. There is no mass.      Tenderness: There is abdominal tenderness. There is no guarding or rebound.      Hernia: No hernia is present.      Comments: Staple noted x 1 to center of incision; removed without difficulty with staple remover.  Incision is healing well with red beefy rash c/w yeast.  Well approximated edges without drainage or bleeding   Skin:     General: Skin is warm and dry.   Neurological:      Mental Status: She is alert and oriented to person, place, and time.   Psychiatric:         Mood and Affect: Mood normal.         Behavior: Behavior normal.              Assessment and Plan    Problem List Items Addressed This Visit    None  Visit Diagnoses       S/P     -  Primary            S/p , 2 week(s) postpartum.  Doing well.    Continued pelvic rest with a return to driving and light physical activity.  Start Zoloft.  Call prn worsening or new symptoms.  Push water, eat regularly, sleep.  To ER prn SI/HI.  Return in about 4 weeks (around 2024), or if symptoms worsen or fail to improve.  Staple removed; keep dry.    Gay Campoverde, APRN  2023  "

## 2024-01-08 ENCOUNTER — POSTPARTUM VISIT (OUTPATIENT)
Dept: OBSTETRICS AND GYNECOLOGY | Facility: CLINIC | Age: 34
End: 2024-01-08
Payer: COMMERCIAL

## 2024-01-08 VITALS
DIASTOLIC BLOOD PRESSURE: 70 MMHG | BODY MASS INDEX: 39.2 KG/M2 | WEIGHT: 229.6 LBS | HEIGHT: 64 IN | SYSTOLIC BLOOD PRESSURE: 112 MMHG

## 2024-01-08 PROBLEM — Z34.90 PRENATAL CARE, ANTEPARTUM: Status: RESOLVED | Noted: 2023-05-10 | Resolved: 2024-01-08

## 2024-01-08 PROBLEM — O99.210 OBESITY IN PREGNANCY, ANTEPARTUM: Status: RESOLVED | Noted: 2023-05-10 | Resolved: 2024-01-08

## 2024-01-08 PROBLEM — Z34.93 THIRD TRIMESTER PREGNANCY: Status: RESOLVED | Noted: 2023-11-24 | Resolved: 2024-01-08

## 2024-01-08 PROBLEM — O36.60X0 EXCESSIVE FETAL GROWTH AFFECTING MANAGEMENT OF MOTHER, ANTEPARTUM: Status: RESOLVED | Noted: 2023-11-20 | Resolved: 2024-01-08

## 2024-01-08 PROCEDURE — 0503F POSTPARTUM CARE VISIT: CPT | Performed by: OBSTETRICS & GYNECOLOGY

## 2024-01-08 RX ORDER — PRENATAL VIT NO.126/IRON/FOLIC 28MG-0.8MG
TABLET ORAL DAILY
COMMUNITY

## 2024-01-08 NOTE — PROGRESS NOTES
Chief Complaint   Patient presents with    Postpartum Care       Postpartum Visit         Sabrina Bass is a 33 y.o.  who presents today for a 6 week(s) postpartum check.     C/S: History of  x 2    Suboptimally controlled type 2 diabetes and scheduled  section    , Low Transverse    Information for the patient's :  George Bass [[id number]]   2023   male   George Bass   4104 g (9 lb 0.8 oz)   Gestational Age: 37w1d        Baby Discharged: Discharged with Mom  Delivering Physician: Chio Dumas MD    At the time of delivery were you diagnosed with any of the following: T2DM. The incision is healing well. Patient describes vaginal bleeding as light.  Patient is breast feeding.  She desires contraceptive methods: NuvaRing vaginal inserts for contraception.  Patient denies bowel or bladder issues.      Patient reports postpartum depression but is taking zoloft. Pt states she has been taking the zoloft for about 4 weeks. Pt states she still does not feel normal. Pt states she used to cry daily and now she cries less. Pt states she is not sad but feels disassociated. Pt states sometimes she feels overwhelmed/stressed. Pt denies SI/HI. Postpartum Depression Screening Questionnaire: 11.      Last Pap : 21. Results: negative. HPV: negative.   Last Completed Pap Smear       This patient has no relevant Health Maintenance data.              The additional following portions of the patient's history were reviewed and updated as appropriate: allergies, current medications, past family history, past medical history, past social history, past surgical history, and problem list.    Review of Systems   Constitutional: Negative.    HENT: Negative.     Eyes: Negative.    Respiratory: Negative.     Cardiovascular: Negative.    Gastrointestinal: Negative.    Endocrine: Negative.    Genitourinary: Negative.    Musculoskeletal: Negative.   "  Skin:  Positive for wound.   Allergic/Immunologic: Negative.    Neurological: Negative.    Hematological: Negative.    Psychiatric/Behavioral:  Positive for stress. The patient is nervous/anxious.      All other systems reviewed and are negative.     I have reviewed and agree with the HPI, ROS, and historical information as entered above. Chio Dumas MD      /70   Ht 162.6 cm (64\")   Wt 104 kg (229 lb 9.6 oz)   LMP 2023   Breastfeeding Yes   BMI 39.41 kg/m²     Physical Exam  Vitals and nursing note reviewed. Exam conducted with a chaperone present.   Constitutional:       Appearance: She is well-developed.   HENT:      Head: Normocephalic and atraumatic.   Pulmonary:      Effort: Pulmonary effort is normal.   Abdominal:      General: A surgical scar is present.      Palpations: Abdomen is soft. Abdomen is not rigid.      Comments: Clean, Dry, and Intact.  No erythema.    Genitourinary:     Vagina: No lesions or prolapsed vaginal walls.      Cervix: No lesion or erythema.      Uterus: Enlarged. Not tender and no uterine prolapse.       Adnexa:         Right: No mass, tenderness or fullness.          Left: No mass, tenderness or fullness.     Musculoskeletal:      Cervical back: Normal range of motion.   Neurological:      Mental Status: She is alert and oriented to person, place, and time.   Psychiatric:         Mood and Affect: Mood normal.         Behavior: Behavior normal.             Assessment and Plan    Problem List Items Addressed This Visit          Gravid and     Delivery of pregnancy by  section - Primary    Overview     23- cs repeat 9#1oz          Postpartum depression    Overview     2024-started on Zoloft 2 weeks.  Patient reports depression worse but still struggling with bonding.  Will send to [facility].  No suicidal or homicidal ideation.         Relevant Medications    sertraline (Zoloft) 50 MG tablet    Other Relevant Orders "    Ambulatory Referral to Behavioral Health (Completed)       S/p , 6 week(s) postpartum.  Doing well.    Return to normal physical activity.  No pelvic restrictions.   Baby doing well.  Contraception: contraceptive methods: Condoms  Return if symptoms worsen or fail to improve, for Annual physical.     Chio Dumas MD  2024

## 2024-01-15 ENCOUNTER — TELEPHONE (OUTPATIENT)
Dept: OBSTETRICS AND GYNECOLOGY | Facility: CLINIC | Age: 34
End: 2024-01-15
Payer: COMMERCIAL

## 2024-01-15 NOTE — TELEPHONE ENCOUNTER
LVM TO SEE IF PT HAS GOTTEN SCHEDULED WITH [facility] YET, IF NOT I WILL CALL TO SET UP 1ST APPT AS TELEHEALTH

## 2024-12-13 ENCOUNTER — TELEPHONE (OUTPATIENT)
Dept: OBSTETRICS AND GYNECOLOGY | Facility: CLINIC | Age: 34
End: 2024-12-13
Payer: COMMERCIAL

## 2024-12-13 NOTE — TELEPHONE ENCOUNTER
Tried to call the number listed in the encounter for the provider twice but it states that the number is not in service.

## 2024-12-13 NOTE — TELEPHONE ENCOUNTER
Caller: JANA GENTILE- PSYCHIATRIC NP    Relationship: NP PROVIDER     Best call back number: [phone]    What is the best time to reach you: ANY    Who are you requesting to speak with (clinical staff, provider,  specific staff member): CLINICAL         What was the call regarding: PROVIDER IS WANTING TO DISCUSS MEDICATION CHANGE [facility] PT WHILE BREAST FEEDING

## (undated) DEVICE — 39" SINGLE PATIENT USE HOVERMATT: Brand: SINGLE PATIENT USE HOVERMATT

## (undated) DEVICE — GLV SURG BIOGEL LTX PF 6 1/2

## (undated) DEVICE — PROXIMATE RH ROTATING HEAD SKIN STAPLERS (35 WIDE) CONTAINS 35 STAINLESS STEEL STAPLES: Brand: PROXIMATE

## (undated) DEVICE — PATIENT RETURN ELECTRODE, SINGLE-USE, CONTACT QUALITY MONITORING, ADULT, WITH 9FT CORD, FOR PATIENTS WEIGING OVER 33LBS. (15KG): Brand: MEGADYNE

## (undated) DEVICE — ADHS SKIN PREMIERPRO EXOFIN TOPICAL HI/VISC .5ML

## (undated) DEVICE — PK C/SECT 10

## (undated) DEVICE — APPL CHLORAPREP TINTED 26ML TEAL

## (undated) DEVICE — SUT GUT CHRM 1 CTX 36IN 905H

## (undated) DEVICE — SOL IRR NACL 0.9PCT BT 1000ML

## (undated) DEVICE — TRAP FLD MINIVAC MEGADYNE 100ML

## (undated) DEVICE — SUT VIC 4/0 KS 27IN VCP662H

## (undated) DEVICE — CLTH CLENS READYCLEANSE PERI CARE PK/5

## (undated) DEVICE — TRY SPINE BLCK WHITACRE 25G 3X5IN

## (undated) DEVICE — SUT GUT CHRM 2/0 CT1 27IN 811H

## (undated) DEVICE — SOL IRR H2O BTL 1000ML STRL

## (undated) DEVICE — RETAIN VISCERA FISH SM

## (undated) DEVICE — SUT PDS 1 TP1 48IN Z880G BX/12

## (undated) DEVICE — 4-PORT MANIFOLD: Brand: NEPTUNE 2

## (undated) DEVICE — TBG PENCL TELESCP MEGADYNE SMOKE EVAC 10FT

## (undated) DEVICE — SKIN AFFIX SURG ADHESIVE 72/CS 0.55ML: Brand: MEDLINE

## (undated) DEVICE — MAT PREVALON MOBL TRANSFR AIR WO/PAD 39X80IN